# Patient Record
Sex: MALE | Race: WHITE
[De-identification: names, ages, dates, MRNs, and addresses within clinical notes are randomized per-mention and may not be internally consistent; named-entity substitution may affect disease eponyms.]

---

## 2019-10-21 ENCOUNTER — HOSPITAL ENCOUNTER (INPATIENT)
Dept: HOSPITAL 62 - ER | Age: 79
LOS: 5 days | Discharge: ANOTHER HOSPITAL | DRG: 378 | End: 2019-10-26
Attending: FAMILY MEDICINE | Admitting: FAMILY MEDICINE
Payer: MEDICARE

## 2019-10-21 DIAGNOSIS — K44.9: ICD-10-CM

## 2019-10-21 DIAGNOSIS — D68.0: ICD-10-CM

## 2019-10-21 DIAGNOSIS — N17.9: ICD-10-CM

## 2019-10-21 DIAGNOSIS — I95.1: ICD-10-CM

## 2019-10-21 DIAGNOSIS — N40.0: ICD-10-CM

## 2019-10-21 DIAGNOSIS — Z82.49: ICD-10-CM

## 2019-10-21 DIAGNOSIS — Z82.61: ICD-10-CM

## 2019-10-21 DIAGNOSIS — M06.9: ICD-10-CM

## 2019-10-21 DIAGNOSIS — K29.70: ICD-10-CM

## 2019-10-21 DIAGNOSIS — M19.90: ICD-10-CM

## 2019-10-21 DIAGNOSIS — Z83.3: ICD-10-CM

## 2019-10-21 DIAGNOSIS — D62: ICD-10-CM

## 2019-10-21 DIAGNOSIS — I35.0: ICD-10-CM

## 2019-10-21 DIAGNOSIS — K64.8: ICD-10-CM

## 2019-10-21 DIAGNOSIS — K92.1: Primary | ICD-10-CM

## 2019-10-21 DIAGNOSIS — E86.0: ICD-10-CM

## 2019-10-21 DIAGNOSIS — D69.59: ICD-10-CM

## 2019-10-21 DIAGNOSIS — M10.9: ICD-10-CM

## 2019-10-21 DIAGNOSIS — I10: ICD-10-CM

## 2019-10-21 LAB
ADD MANUAL DIFF: NO
ALBUMIN SERPL-MCNC: 3.8 G/DL (ref 3.5–5)
ALP SERPL-CCNC: 167 U/L (ref 38–126)
ANION GAP SERPL CALC-SCNC: 10 MMOL/L (ref 5–19)
APPEARANCE UR: (no result)
APTT PPP: (no result) S
AST SERPL-CCNC: 86 U/L (ref 17–59)
BASOPHILS # BLD AUTO: 0 10^3/UL (ref 0–0.2)
BASOPHILS NFR BLD AUTO: 0.5 % (ref 0–2)
BILIRUB DIRECT SERPL-MCNC: 0.2 MG/DL (ref 0–0.4)
BILIRUB SERPL-MCNC: 0.4 MG/DL (ref 0.2–1.3)
BILIRUB UR QL STRIP: NEGATIVE
BUN SERPL-MCNC: 30 MG/DL (ref 7–20)
CALCIUM: 9.4 MG/DL (ref 8.4–10.2)
CHLORIDE SERPL-SCNC: 108 MMOL/L (ref 98–107)
CK MB SERPL-MCNC: 1.1 NG/ML (ref ?–4.55)
CK SERPL-CCNC: 110 U/L (ref 55–170)
CO2 SERPL-SCNC: 22 MMOL/L (ref 22–30)
EOSINOPHIL # BLD AUTO: 0.6 10^3/UL (ref 0–0.6)
EOSINOPHIL NFR BLD AUTO: 12.4 % (ref 0–6)
ERYTHROCYTE [DISTWIDTH] IN BLOOD BY AUTOMATED COUNT: 15 % (ref 11.5–14)
FREE T4 (FREE THYROXINE): 0.85 NG/DL (ref 0.78–2.19)
GLUCOSE SERPL-MCNC: 118 MG/DL (ref 75–110)
GLUCOSE UR STRIP-MCNC: NEGATIVE MG/DL
HCT VFR BLD CALC: 28 % (ref 37.9–51)
HGB BLD-MCNC: 9.3 G/DL (ref 13.5–17)
INR PPP: 1.01
KETONES UR STRIP-MCNC: (no result) MG/DL
LYMPHOCYTES # BLD AUTO: 0.5 10^3/UL (ref 0.5–4.7)
LYMPHOCYTES NFR BLD AUTO: 11.5 % (ref 13–45)
MCH RBC QN AUTO: 35.1 PG (ref 27–33.4)
MCHC RBC AUTO-ENTMCNC: 33.4 G/DL (ref 32–36)
MCV RBC AUTO: 105 FL (ref 80–97)
MONOCYTES # BLD AUTO: 0.4 10^3/UL (ref 0.1–1.4)
MONOCYTES NFR BLD AUTO: 8.5 % (ref 3–13)
NEUTROPHILS # BLD AUTO: 3.2 10^3/UL (ref 1.7–8.2)
NEUTS SEG NFR BLD AUTO: 67.1 % (ref 42–78)
NITRITE UR QL STRIP: NEGATIVE
PH UR STRIP: 5 [PH] (ref 5–9)
PLATELET # BLD: 109 10^3/UL (ref 150–450)
POTASSIUM SERPL-SCNC: 4.4 MMOL/L (ref 3.6–5)
PROT SERPL-MCNC: 8.4 G/DL (ref 6.3–8.2)
PROT UR STRIP-MCNC: NEGATIVE MG/DL
PROTHROMBIN TIME: 13.3 SEC (ref 11.4–15.4)
RBC # BLD AUTO: 2.66 10^6/UL (ref 4.35–5.55)
SP GR UR STRIP: 1.02
T3FREE SERPL-MCNC: 2.82 PG/ML (ref 2.77–5.27)
TOTAL CELLS COUNTED % (AUTO): 100 %
TROPONIN I SERPL-MCNC: 0.04 NG/ML
TSH SERPL-ACNC: 6.03 UIU/ML (ref 0.47–4.68)
UROBILINOGEN UR-MCNC: NEGATIVE MG/DL (ref ?–2)
WBC # BLD AUTO: 4.8 10^3/UL (ref 4–10.5)

## 2019-10-21 PROCEDURE — 88342 IMHCHEM/IMCYTCHM 1ST ANTB: CPT

## 2019-10-21 PROCEDURE — 86920 COMPATIBILITY TEST SPIN: CPT

## 2019-10-21 PROCEDURE — 85027 COMPLETE CBC AUTOMATED: CPT

## 2019-10-21 PROCEDURE — 82553 CREATINE MB FRACTION: CPT

## 2019-10-21 PROCEDURE — 83735 ASSAY OF MAGNESIUM: CPT

## 2019-10-21 PROCEDURE — 43239 EGD BIOPSY SINGLE/MULTIPLE: CPT

## 2019-10-21 PROCEDURE — 82550 ASSAY OF CK (CPK): CPT

## 2019-10-21 PROCEDURE — 86901 BLOOD TYPING SEROLOGIC RH(D): CPT

## 2019-10-21 PROCEDURE — 93010 ELECTROCARDIOGRAM REPORT: CPT

## 2019-10-21 PROCEDURE — 81001 URINALYSIS AUTO W/SCOPE: CPT

## 2019-10-21 PROCEDURE — 84484 ASSAY OF TROPONIN QUANT: CPT

## 2019-10-21 PROCEDURE — 93306 TTE W/DOPPLER COMPLETE: CPT

## 2019-10-21 PROCEDURE — 84443 ASSAY THYROID STIM HORMONE: CPT

## 2019-10-21 PROCEDURE — 71046 X-RAY EXAM CHEST 2 VIEWS: CPT

## 2019-10-21 PROCEDURE — 36415 COLL VENOUS BLD VENIPUNCTURE: CPT

## 2019-10-21 PROCEDURE — 96375 TX/PRO/DX INJ NEW DRUG ADDON: CPT

## 2019-10-21 PROCEDURE — 85730 THROMBOPLASTIN TIME PARTIAL: CPT

## 2019-10-21 PROCEDURE — 84481 FREE ASSAY (FT-3): CPT

## 2019-10-21 PROCEDURE — C9113 INJ PANTOPRAZOLE SODIUM, VIA: HCPCS

## 2019-10-21 PROCEDURE — 36430 TRANSFUSION BLD/BLD COMPNT: CPT

## 2019-10-21 PROCEDURE — 86900 BLOOD TYPING SEROLOGIC ABO: CPT

## 2019-10-21 PROCEDURE — 80048 BASIC METABOLIC PNL TOTAL CA: CPT

## 2019-10-21 PROCEDURE — 85610 PROTHROMBIN TIME: CPT

## 2019-10-21 PROCEDURE — P9016 RBC LEUKOCYTES REDUCED: HCPCS

## 2019-10-21 PROCEDURE — S0028 INJECTION, FAMOTIDINE, 20 MG: HCPCS

## 2019-10-21 PROCEDURE — P9035 PLATELET PHERES LEUKOREDUCED: HCPCS

## 2019-10-21 PROCEDURE — 88305 TISSUE EXAM BY PATHOLOGIST: CPT

## 2019-10-21 PROCEDURE — 96366 THER/PROPH/DIAG IV INF ADDON: CPT

## 2019-10-21 PROCEDURE — 93005 ELECTROCARDIOGRAM TRACING: CPT

## 2019-10-21 PROCEDURE — 86850 RBC ANTIBODY SCREEN: CPT

## 2019-10-21 PROCEDURE — 85025 COMPLETE CBC W/AUTO DIFF WBC: CPT

## 2019-10-21 PROCEDURE — 99285 EMERGENCY DEPT VISIT HI MDM: CPT

## 2019-10-21 PROCEDURE — 84439 ASSAY OF FREE THYROXINE: CPT

## 2019-10-21 PROCEDURE — 80053 COMPREHEN METABOLIC PANEL: CPT

## 2019-10-21 PROCEDURE — 96365 THER/PROPH/DIAG IV INF INIT: CPT

## 2019-10-21 RX ADMIN — SODIUM CHLORIDE, SODIUM LACTATE, POTASSIUM CHLORIDE, AND CALCIUM CHLORIDE PRN MLS/HR: .6; .31; .03; .02 INJECTION, SOLUTION INTRAVENOUS at 23:59

## 2019-10-21 RX ADMIN — PANTOPRAZOLE SODIUM SCH: 40 INJECTION, POWDER, FOR SOLUTION INTRAVENOUS at 22:26

## 2019-10-21 RX ADMIN — SUCRALFATE SCH: 1 TABLET ORAL at 22:26

## 2019-10-21 RX ADMIN — Medication SCH: at 22:11

## 2019-10-21 RX ADMIN — ACETAMINOPHEN PRN MG: 325 TABLET ORAL at 23:55

## 2019-10-21 NOTE — ER DOCUMENT REPORT
ED Medical Screen (RME)





- General


Chief Complaint: Dizziness


Stated Complaint: DIZZINESS,VOMITING


Time Seen by Provider: 10/21/19 13:47


Primary Care Provider: 


MEGAN TRENT PA [Primary Care Provider] - Follow up as needed


Mode of Arrival: Wheelchair


Information source: Patient


Notes: 





78-year-old male presented to ED for difficulty breathing.  He states he went to

the doctor for this difficulty breathing and they did a EKG and when he was 

getting up to get ready to go home he got extremely dizzy almost fell if it 

would have if the person there had not caught him.  He states he vomited after 

that they checked his blood pressure and it was low.  His blood pressure is 

90/60 in the triage area.  Wife states he was disoriented after this episode.























I have greeted and performed a rapid initial assessment of this patient.  A 

comprehensive ED assessment and evaluation of the patient, analysis of test 

results and completion of medical decision making process will be conducted by 

an additional ED providers.


TRAVEL OUTSIDE OF THE [place] IN LAST 30 DAYS: No





- Related Data


Allergies/Adverse Reactions: 


                                        





No Known Allergies Allergy (Verified 05/22/15 10:54)


   











Past Medical History





- Past Medical History


Cardiac Medical History: Reports: Hx Hypertension


Musculoskeltal Medical History: Reports Hx Arthritis





- Immunizations


Hx Diphtheria, Pertussis, Tetanus Vaccination: Yes





Physical Exam





- Vital signs


Vitals: 





                                        











Temp Pulse Resp BP Pulse Ox


 


 98.2 F   72   16   90/60 L  98 


 


 10/21/19 13:33  10/21/19 13:33  10/21/19 13:33  10/21/19 13:33  10/21/19 13:33














Course





- Vital Signs


Vital signs: 





                                        











Temp Pulse Resp BP Pulse Ox


 


 98.2 F   72   16   90/60 L  98 


 


 10/21/19 13:33  10/21/19 13:33  10/21/19 13:33  10/21/19 13:33  10/21/19 13:33














Doctor's Discharge





- Discharge


Referrals: 


MEGAN TRENT PA [Primary Care Provider] - Follow up as needed

## 2019-10-21 NOTE — EKG REPORT
SEVERITY:- ABNORMAL ECG -

PACEMAKER SPIKES OR ARTIFACTS

SINUS RHYTHM

FIRST DEGREE AV BLOCK

:

Confirmed by: Shantelle Gleason MD 21-Oct-2019 16:54:12

## 2019-10-21 NOTE — ER DOCUMENT REPORT
ED General





- General


Chief Complaint: Dizziness


Stated Complaint: DIZZINESS,VOMITING


Time Seen by Provider: 10/21/19 13:47


Primary Care Provider: 


MEGAN TRENT PA [Primary Care Provider] - Follow up as needed


Mode of Arrival: Wheelchair


Information source: Patient, Relative


TRAVEL OUTSIDE OF THE [place] IN LAST 30 DAYS: No





- HPI


Notes: 





78-year-old male presented to ED for difficulty breathing.  He states he went to

the doctor for this difficulty breathing and they did a EKG and when he was 

getting up to get ready to go home he got extremely dizzy almost fell if it 

would have if the person there had not caught him.  He states he vomited after 

that they checked his blood pressure and it was low.  His blood pressure is 9

0/60 in the triage area.  Wife states he was disoriented after this episode 

briefly, but quickly was acting like his normal self thereafter.  There is no 

focal unilateral motor or sensory deficit.  The patient admits to feeling 

lightheaded over the course the past week.  He does note having somewhat dark-

colored bowel movements.





Patient denies any chest pain, abdominal pain, fever, constipation, significant 

diarrhea, bright red blood per rectum, fever, chills.





Patient does report urinary frequency with sensation of incomplete voiding.  

patient is on Flomax.





No prior history of GI bleeds.  Patient does have a history of gout, 

hypertension, rheumatoid arthritis and previous neck surgery.





Social history non-smoker, rare alcohol use, not to exceed 1 drink per day.





Regular practitioner Dr. Ledesma with [facility].





Medications include Lexapro, folic acid, Plavix, Flomax, methotrexate, losartan,

allopurinol, 2 Barry, Singulair, amlodipine, leflunomide








- Related Data


Allergies/Adverse Reactions: 


                                        





No Known Allergies Allergy (Verified 05/22/15 10:54)


   











Past Medical History





- General


Information source: Patient





- Social History


Smoking Status: Never Smoker


Frequency of alcohol use: None


Drug Abuse: None


Lives with: Family


Family History: Reviewed & Not Pertinent


Patient has suicidal ideation: No


Patient has homicidal ideation: No





- Past Medical History


Cardiac Medical History: Reports: Hx Hypertension


Musculoskeletal Medical History: Reports Hx Arthritis





- Immunizations


Hx Diphtheria, Pertussis, Tetanus Vaccination: Yes





Review of Systems





- Review of Systems


-: Yes All other systems reviewed and negative





Physical Exam





- Vital signs


Vitals: 


                                        











Temp Pulse Resp BP Pulse Ox


 


 98.2 F   72   16   90/60 L  98 


 


 10/21/19 13:33  10/21/19 13:33  10/21/19 13:33  10/21/19 13:33  10/21/19 13:33














- Notes


Notes: 





PHYSICAL EXAMINATION:





GENERAL: Well-appearing, well-nourished and in no acute distress.





HEAD: Atraumatic, normocephalic.





EYES: Pupils equal round and reactive to light, extraocular movements intact, 

sclera anicteric, conjunctiva are normal.





ENT: Nares patent, oropharynx clear without exudates.  Slightly dry mucous 

membranes.





NECK: Normal range of motion, supple without lymphadenopathy





LUNGS: Breath sounds clear to auscultation bilaterally and equal.  No wheezes 

rales or rhonchi.





HEART: Regular rate and rhythm with 4/6 holosystolic ejection murmur best 

auscultated over the apex.





ABDOMEN: Soft, nontender, nondistended abdomen.  No guarding, no rebound.  No 

masses appreciated.





Rectal exam shows flash heme positive with a slightly dark bowel movement 

specimen.  Prostate firm but nontender and without nodularity.





Musculoskeletal: Normal range of motion, no pitting or edema.  No cyanosis.





NEUROLOGICAL: Cranial nerves grossly intact.  Normal speech, normal gait.  

Normal sensory, motor exams.  No focal deficits.





PSYCH: Normal mood, normal affect.





SKIN: Warm, Dry, normal turgor, no rashes or lesions noted.





Course





- Re-evaluation


Re-evalutation: 





10/21/19 18:42


Discussion was undertaken with the patient regarding possible transfusion, and 

the patient was in agreement if he would need one.  Initial hemoglobin was 9.3, 

where is the patient's usual hemoglobin ranges from 11.7-15.3.  White blood cell

count somewhat low at 4.8 and platelet count was 109.  Patient had a BUN of 30 

and a creatinine of 1.6, which patient had a prior creatinine of 1.49 in the 

past but otherwise has more normal creatinine levels.





Patient was given IV Pepcid and p.o. Carafate and Protonix bolus with a Protonix

drip.  Patient was given normal saline 500 cc.  Blood pressure initially was 

90/60 and a repeat blood pressure was 92/50.  After IV fluids, the patient had 

no further hypotension noted.  O2 sat remained stable and chest x-ray was clear.

 Patient's dyspnea appears secondary to his hypotension from his GI blood loss.





Troponin mildly elevated at 0.045, but patient denied ever having any chest 

pain.  Repeat troponin improved at 0.035.


10/21/19 18:52





Discussion was undertaken with the patient and family and they were in agreement

with the patient being admitted for further evaluation and care.  Discussion was

undertaken with Dr. Weiland who agreed to admit the patient for further care and

management.  No evidence for urinary retention as a post void residual was only 

81.  The patient did have mild ketosis with Hyaline casts in his urine 

consistent with dehydration.  The patient states he has been drinking less 

fluids due to his urinary frequency recently.


10/21/19 19:34








- Vital Signs


Vital signs: 


                                        











Temp Pulse Resp BP Pulse Ox


 


 97.8 F   71   18   146/65 H  100 


 


 10/21/19 19:05  10/21/19 14:10  10/21/19 19:05  10/21/19 19:05  10/21/19 19:05














- Laboratory


Result Diagrams: 


                                 10/21/19 14:20





                                 10/21/19 14:20


Laboratory results interpreted by me: 


                                        











  10/21/19 10/21/19 10/21/19





  14:20 14:20 14:20


 


RBC  2.66 L  


 


Hgb  9.3 L  


 


Hct  28.0 L  


 


MCV  105 H  


 


MCH  35.1 H  


 


RDW  15.0 H  


 


Plt Count  109 L  


 


Lymph % (Auto)  11.5 L  


 


Eos % (Auto)  12.4 H  


 


Chloride   108 H 


 


BUN   30 H 


 


Creatinine   1.60 H 


 


Est GFR ( Amer)   51 L 


 


Est GFR (MDRD) Non-Af   42 L 


 


Glucose   118 H 


 


AST   86 H 


 


Alkaline Phosphatase   167 H 


 


Total Protein   8.4 H 


 


Urine Ketones    TRACE H


 


Urine Ascorbic Acid    40 H














- EKG Interpretation by Me


EKG shows normal: Sinus rhythm


Additional EKG results interpreted by me: 





10/21/19 19:36


EKG is interpreted by me showed sinus rhythm heart rate of 67 with borderline 

first-degree AV block noted.  There is no gross evidence for acute MI or 

ischemia noted.  No old EKG available for comparison.





Critical Care Note





- Critical Care Note


Total time excluding time spent on procedures (mins): 43





Discharge





- Discharge


Clinical Impression: 


 Thrombocytopenia, Acute anemia, Renal insufficiency, Near syncope, Dehydration





GI bleed


Qualifiers:


 GI bleed type/associated pathology: unspecified gastrointestinal hemorrhage 

type Qualified Code(s): K92.2 - Gastrointestinal hemorrhage, unspecified





Hypotension


Qualifiers:


 Hypotension type: hypotension due to hypovolemia Qualified Code(s): I95.89 - 

Other hypotension





Disposition: ADMITTED AS INPATIENT


Admitting Provider: Weiland (Hospitalist)


Referrals: 


MEGAN TRENT PA [Primary Care Provider] - Follow up as needed

## 2019-10-21 NOTE — RADIOLOGY REPORT (SQ)
EXAM DESCRIPTION:  CHEST 2 VIEWS



COMPLETED DATE/TIME:  10/21/2019 2:35 pm



REASON FOR STUDY:  syncope



COMPARISON:  5/22/2015.



EXAM PARAMETERS:  NUMBER OF VIEWS: two views

TECHNIQUE: Digital Frontal and Lateral radiographic views of the chest acquired.

RADIATION DOSE: NA

LIMITATIONS: none



FINDINGS:  LUNGS AND PLEURA: No opacities, masses or pneumothorax. No pleural effusion.

MEDIASTINUM AND HILAR STRUCTURES: No masses or contour abnormalities.

HEART AND VASCULAR STRUCTURES: Heart normal size.  No evidence for failure.

BONES: No acute findings.

HARDWARE: Hardware in the cervical spine.

OTHER: No other significant finding.



IMPRESSION:  NO ACUTE RADIOGRAPHIC FINDING IN THE CHEST.



TECHNICAL DOCUMENTATION:  JOB ID:  [id number]

 2011 [employer]- All Rights Reserved



Reading location - IP/workstation name: [email or web address]

## 2019-10-22 LAB
ADD MANUAL DIFF: NO
ANION GAP SERPL CALC-SCNC: 6 MMOL/L (ref 5–19)
APTT BLD: 30.7 SEC (ref 23.5–35.8)
BASOPHILS # BLD AUTO: 0 10^3/UL (ref 0–0.2)
BASOPHILS NFR BLD AUTO: 0.5 % (ref 0–2)
BUN SERPL-MCNC: 23 MG/DL (ref 7–20)
CALCIUM: 8.3 MG/DL (ref 8.4–10.2)
CHLORIDE SERPL-SCNC: 109 MMOL/L (ref 98–107)
CK MB SERPL-MCNC: 1.04 NG/ML (ref ?–4.55)
CK MB SERPL-MCNC: 1.05 NG/ML (ref ?–4.55)
CK MB SERPL-MCNC: 1.43 NG/ML (ref ?–4.55)
CO2 SERPL-SCNC: 23 MMOL/L (ref 22–30)
EOSINOPHIL # BLD AUTO: 0.6 10^3/UL (ref 0–0.6)
EOSINOPHIL NFR BLD AUTO: 19.6 % (ref 0–6)
ERYTHROCYTE [DISTWIDTH] IN BLOOD BY AUTOMATED COUNT: 15 % (ref 11.5–14)
ERYTHROCYTE [DISTWIDTH] IN BLOOD BY AUTOMATED COUNT: 18.7 % (ref 11.5–14)
GLUCOSE SERPL-MCNC: 94 MG/DL (ref 75–110)
HCT VFR BLD CALC: 20.9 % (ref 37.9–51)
HCT VFR BLD CALC: 27.8 % (ref 37.9–51)
HGB BLD-MCNC: 7.1 G/DL (ref 13.5–17)
HGB BLD-MCNC: 9.5 G/DL (ref 13.5–17)
INR PPP: 1.08
LYMPHOCYTES # BLD AUTO: 0.6 10^3/UL (ref 0.5–4.7)
LYMPHOCYTES NFR BLD AUTO: 20.1 % (ref 13–45)
MCH RBC QN AUTO: 33.2 PG (ref 27–33.4)
MCH RBC QN AUTO: 35.4 PG (ref 27–33.4)
MCHC RBC AUTO-ENTMCNC: 34 G/DL (ref 32–36)
MCHC RBC AUTO-ENTMCNC: 34.3 G/DL (ref 32–36)
MCV RBC AUTO: 104 FL (ref 80–97)
MCV RBC AUTO: 97 FL (ref 80–97)
MONOCYTES # BLD AUTO: 0.3 10^3/UL (ref 0.1–1.4)
MONOCYTES NFR BLD AUTO: 10.8 % (ref 3–13)
NEUTROPHILS # BLD AUTO: 1.5 10^3/UL (ref 1.7–8.2)
NEUTS SEG NFR BLD AUTO: 49 % (ref 42–78)
PLATELET # BLD: 74 10^3/UL (ref 150–450)
PLATELET # BLD: 82 10^3/UL (ref 150–450)
POTASSIUM SERPL-SCNC: 4.1 MMOL/L (ref 3.6–5)
PROTHROMBIN TIME: 14 SEC (ref 11.4–15.4)
RBC # BLD AUTO: 2.01 10^6/UL (ref 4.35–5.55)
RBC # BLD AUTO: 2.88 10^6/UL (ref 4.35–5.55)
TOTAL CELLS COUNTED % (AUTO): 100 %
TROPONIN I SERPL-MCNC: 0.04 NG/ML
TROPONIN I SERPL-MCNC: 0.05 NG/ML
TROPONIN I SERPL-MCNC: 0.05 NG/ML
WBC # BLD AUTO: 2.7 10^3/UL (ref 4–10.5)
WBC # BLD AUTO: 3.1 10^3/UL (ref 4–10.5)

## 2019-10-22 PROCEDURE — 30233N1 TRANSFUSION OF NONAUTOLOGOUS RED BLOOD CELLS INTO PERIPHERAL VEIN, PERCUTANEOUS APPROACH: ICD-10-PCS | Performed by: STUDENT IN AN ORGANIZED HEALTH CARE EDUCATION/TRAINING PROGRAM

## 2019-10-22 RX ADMIN — TAMSULOSIN HYDROCHLORIDE SCH MG: 0.4 CAPSULE ORAL at 17:45

## 2019-10-22 RX ADMIN — SODIUM CHLORIDE, SODIUM LACTATE, POTASSIUM CHLORIDE, AND CALCIUM CHLORIDE PRN MLS/HR: .6; .31; .03; .02 INJECTION, SOLUTION INTRAVENOUS at 11:42

## 2019-10-22 RX ADMIN — Medication SCH: at 13:33

## 2019-10-22 RX ADMIN — PANTOPRAZOLE SODIUM SCH MG: 40 INJECTION, POWDER, FOR SOLUTION INTRAVENOUS at 09:27

## 2019-10-22 RX ADMIN — MULTIVITAMIN TABLET SCH TAB: TABLET at 09:27

## 2019-10-22 RX ADMIN — SUCRALFATE SCH GM: 1 TABLET ORAL at 08:44

## 2019-10-22 RX ADMIN — ESCITALOPRAM OXALATE SCH MG: 10 TABLET, FILM COATED ORAL at 09:27

## 2019-10-22 RX ADMIN — SUCRALFATE SCH GM: 1 TABLET ORAL at 12:03

## 2019-10-22 RX ADMIN — SODIUM CHLORIDE, SODIUM LACTATE, POTASSIUM CHLORIDE, AND CALCIUM CHLORIDE PRN MLS/HR: .6; .31; .03; .02 INJECTION, SOLUTION INTRAVENOUS at 05:43

## 2019-10-22 RX ADMIN — SODIUM CHLORIDE, SODIUM LACTATE, POTASSIUM CHLORIDE, AND CALCIUM CHLORIDE PRN MLS/HR: .6; .31; .03; .02 INJECTION, SOLUTION INTRAVENOUS at 18:13

## 2019-10-22 RX ADMIN — ALLOPURINOL SCH MG: 300 TABLET ORAL at 09:27

## 2019-10-22 RX ADMIN — DOCUSATE SODIUM SCH MG: 50 LIQUID ORAL at 09:26

## 2019-10-22 RX ADMIN — DOCUSATE SODIUM SCH: 50 LIQUID ORAL at 17:45

## 2019-10-22 RX ADMIN — SUCRALFATE SCH GM: 1 TABLET ORAL at 21:06

## 2019-10-22 RX ADMIN — MONTELUKAST SODIUM SCH MG: 10 TABLET, FILM COATED ORAL at 17:45

## 2019-10-22 RX ADMIN — FOLIC ACID SCH MG: 1 TABLET ORAL at 09:27

## 2019-10-22 RX ADMIN — SUCRALFATE SCH GM: 1 TABLET ORAL at 16:25

## 2019-10-22 RX ADMIN — TAMSULOSIN HYDROCHLORIDE SCH MG: 0.4 CAPSULE ORAL at 09:27

## 2019-10-22 RX ADMIN — Medication SCH: at 21:06

## 2019-10-22 RX ADMIN — PANTOPRAZOLE SODIUM SCH MG: 40 INJECTION, POWDER, FOR SOLUTION INTRAVENOUS at 21:06

## 2019-10-22 RX ADMIN — Medication SCH: at 05:40

## 2019-10-22 NOTE — PDOC H&P
History of Present Illness


Admission Date/PCP: 


10/21/2019   19:37  


MYRTLE MUSA


Patient complains of: Dyspnea


History of Present Illness: 


LYNETTE BROWN is a 78 year old male who presented to the emergency room from

his primary care provider's office where he had been noted to be hypotensive on 

an evaluation for a one-week history of dyspnea.  Patient admits gradually 

worsening dyspnea over the last week becoming moderate to severe resulting in 

his visit to his doctor today.  He admits that his dyspnea worsens with any 

exertion and has been associated with lightheadedness with standing or walking. 

He also acknowledges the accompanying symptoms of dark-colored bowel movements 

and decreased oral intake.  He denies other associated or accompanying signs and

symptoms.  He denies prior similar episodes and has not identified any 

additional aggravating or ameliorating factors for his dyspnea.  In the 

emergency room he was found to have postural hypotension and was noted to have a

hemoglobin of 9.3 with a Hemoccult positive stool.  His BUN was 30 with a 

creatinine of 1.6.  He was subsequently admitted to the hospital for further 

evaluation treatment.





Past Medical History


Cardiac Medical History: Reports: Hypertension


   Denies: Coronary Artery Disease, Hyperlipidema


Pulmonary Medical History: 


   Denies: Asthma, Chronic Obstructive Pulmonary Disease (COPD)


EENT Medical History: Reports: Nose - Allergic rhinitis


   Denies: Cataracts, Ears - Hearing aids


Neurological Medical History: 


   Denies: Hemorrhagic CVA, Ischemic CVA, Seizures


Endocrine Medical History: 


   Denies: Diabetes Mellitus Type 1, Diabetes Mellitus Type 2, Hyperthyroidism, 

Hypothyroidism, Obesity


Renal/ Medical History: Reports: Other - Benign prostatic hyperplasia


   Denies: Chronic Kidney Disease, Nephrolithiasis


Malignancy Medical History: Reports: None


GI Medical History: 


   Denies: Cirrhosis, Crohn's Disease, Gastroesophageal Reflux Disease, 

Hepatitis, Peptic Ulcer Disease, Ulcerative Colitis


Musculoskeltal Medical History: Reports: Arthritis - Rheumatoid arthritis, Gout


Skin Medical History: 


   Denies: Eczema, Psoriasis


Psychiatric Medical History: Reports: Depression


   Denies: Alcohol Dependency, Substance Abuse, Tobacco Dependency


Traumatic Medical History: Reports: None


Hematology: Reports: Anemia


   Denies: Bleeding Tendencies


Infectious Medical History: Reports: None





Past Surgical History


Past Surgical History: Reports: Orthopedic Surgery - Neck surgery





Social History


Information Source: Patient


Lives with: Spouse/Significant other


Smoking Status: Never Smoker


Electronic Cigarette use?: No


Frequency of Alcohol Use: Rare


Hx Recreational Drug Use: No


Drugs: None


Hx Prescription Drug Abuse: No





- Advance Directive


Resuscitation Status: Full Code


Surrogate healthcare decision maker:: 


Spenser Brown





Family History


Family History: Arthritis, CAD, DM, Hypertension, Malignancy, Other - Peripheral

vascular disease


Parental Family History Reviewed: Yes


Children Family History Reviewed: No


Sibling(s) Family History Reviewed.: Yes





Medication/Allergy


Home Medications: 








Allopurinol [Zyloprim 300 mg Tablet] 300 mg PO DAILY 10/21/19 


Amlodipine Besylate [Norvasc 5 mg Tablet] 5 mg PO DAILY 10/21/19 


Azelastine HCl 1 spray NS BID 10/21/19 


Clopidogrel Bisulfate [Plavix 75 mg Tablet] 75 mg PO DAILY 10/21/19 


Escitalopram Oxalate [Lexapro 10 mg Tablet] 10 mg PO DAILY 10/21/19 


Folic Acid [Folvite 1 mg Tablet] 1 mg PO DAILY 10/21/19 


Leflunomide [Arava 20 mg Tablet] 20 mg PO DAILY 10/21/19 


Losartan Potassium [Cozaar 50 mg Tablet] 50 mg PO DAILY 10/21/19 


Methotrexate Sodium [Rheumatrex 2.5 mg Tablet] 5 mg PO FR@1000 10/21/19 


Montelukast Sodium [Singulair 10 mg Tablet] 10 mg PO QPM 10/21/19 


Multivitamin [One-A-Day Essential] 1 each PO DAILY 10/21/19 


Tamsulosin HCl [Flomax] 0.4 mg PO BID 10/21/19 


Turmeric Root Extract [Turmeric Curcumin] 500 mg PO DAILY 10/21/19 








Allergies/Adverse Reactions: 


                                        





No Known Allergies Allergy (Verified 05/22/15 10:54)


   











Review of Systems


Constitutional: PRESENT: as per HPI, anorexia, other - Lightheadedness with 

standing.  ABSENT: chills, fever(s)


Eyes: ABSENT: visual disturbances, other - Eye pain


Ears: ABSENT: hearing changes, other - Ear pain


Nose, Mouth, and Throat: ABSENT: mouth pain, sore throat


Cardiovascular: PRESENT: dyspnea on exertion.  ABSENT: chest pain, palpitations


Respiratory: PRESENT: dyspnea.  ABSENT: cough


Gastrointestinal: PRESENT: as per HPI, melena.  ABSENT: abdominal pain, 

constipation, diarrhea, hematochezia, nausea, vomiting


Genitourinary: ABSENT: dysuria, hematuria


Musculoskeletal: ABSENT: joint swelling, muscle weakness


Integumentary: ABSENT: pruritus, rash


Neurological: PRESENT: as per HPI, other - Lightheadedness with standing or 

activity.  ABSENT: confusion, convulsions, focal weakness, memory loss, syncope


Psychiatric: ABSENT: anxiety, depression


Endocrine: ABSENT: cold intolerance, heat intolerance


Hematologic/Lymphatic: ABSENT: easy bleeding, easy bruising


Allergic/Immunologic: PRESENT: seasonal rhinorrhea





Physical Exam


Vital Signs: 


                                        











Temp Pulse Resp BP Pulse Ox


 


 97.8 F   71   18   146/65 H  100 


 


 10/21/19 19:05  10/21/19 14:10  10/21/19 19:05  10/21/19 19:05  10/21/19 19:05








                                 Intake & Output











 10/19/19 10/20/19 10/21/19





 23:59 23:59 23:59


 


Intake Total   500


 


Balance   500


 


Weight   75.3 kg











General appearance: PRESENT: no acute distress, cooperative


Head exam: PRESENT: atraumatic, normocephalic


Eye exam: PRESENT: conjunctiva pink.  ABSENT: conjunctival injection, scleral 

icterus


Ear exam: PRESENT: normal external ear exam.  ABSENT: bleeding, drainage


Mouth exam: PRESENT: dry mucosa, neck supple


Neck exam: ABSENT: thyromegaly, tracheal deviation


Respiratory exam: PRESENT: clear to auscultation luz, symmetrical, unlabored


Cardiovascular exam: PRESENT: RRR.  ABSENT: clicks, gallop, rubs


Pulses: PRESENT: normal radial pulses, normal dorsalis pedis pul


Vascular exam: PRESENT: normal capillary refill.  ABSENT: pallor


GI/Abdominal exam: PRESENT: normal bowel sounds, soft.  ABSENT: tenderness


Rectal exam: PRESENT: deferred


Extremities exam: ABSENT: joint swelling, pedal edema


Musculoskeletal exam: ABSENT: deformity, dislocation


Neurological exam: PRESENT: alert, oriented to person, oriented to place, 

oriented to time, oriented to situation, CN II-XII grossly intact.  ABSENT: 

motor sensory deficit


Psychiatric exam: PRESENT: appropriate affect, normal mood


Skin exam: PRESENT: dry, intact, warm.  ABSENT: jaundice, rash, urticaria





Results


Laboratory Results: 


                                        





                                 10/21/19 14:20 





                                 10/21/19 14:20 





                                        











  10/21/19 10/21/19 10/21/19





  14:20 14:20 14:20


 


WBC  4.8  


 


RBC  2.66 L  


 


Hgb  9.3 L  


 


Hct  28.0 L  


 


MCV  105 H  


 


MCH  35.1 H  


 


MCHC  33.4  


 


RDW  15.0 H  


 


Plt Count  109 L  


 


Seg Neutrophils %  67.1  


 


Sodium   140.3 


 


Potassium   4.4 


 


Chloride   108 H 


 


Carbon Dioxide   22 


 


Anion Gap   10 


 


BUN   30 H 


 


Creatinine   1.60 H 


 


Est GFR ( Amer)   51 L 


 


Glucose   118 H 


 


Calcium   9.4 


 


Total Bilirubin   0.4 


 


AST   86 H 


 


Alkaline Phosphatase   167 H 


 


Total Protein   8.4 H 


 


Albumin   3.8 


 


Urine Color    MARLY


 


Urine Appearance    SLIGHTLY-CLOUDY


 


Urine pH    5.0


 


Ur Specific Gravity    1.020


 


Urine Protein    NEGATIVE


 


Urine Glucose (UA)    NEGATIVE


 


Urine Ketones    TRACE H


 


Urine Blood    NEGATIVE


 


Urine Nitrite    NEGATIVE


 


Ur Leukocyte Esterase    NEGATIVE


 


Urine WBC (Auto)    3


 


Urine RBC (Auto)    1


 


Blood Type   


 


Antibody Screen   














  10/21/19





  18:20


 


WBC 


 


RBC 


 


Hgb 


 


Hct 


 


MCV 


 


MCH 


 


MCHC 


 


RDW 


 


Plt Count 


 


Seg Neutrophils % 


 


Sodium 


 


Potassium 


 


Chloride 


 


Carbon Dioxide 


 


Anion Gap 


 


BUN 


 


Creatinine 


 


Est GFR (African Amer) 


 


Glucose 


 


Calcium 


 


Total Bilirubin 


 


AST 


 


Alkaline Phosphatase 


 


Total Protein 


 


Albumin 


 


Urine Color 


 


Urine Appearance 


 


Urine pH 


 


Ur Specific Gravity 


 


Urine Protein 


 


Urine Glucose (UA) 


 


Urine Ketones 


 


Urine Blood 


 


Urine Nitrite 


 


Ur Leukocyte Esterase 


 


Urine WBC (Auto) 


 


Urine RBC (Auto) 


 


Blood Type  Cancelled


 


Antibody Screen  Cancelled








                                        











  10/21/19 10/21/19 10/21/19





  14:20 14:20 18:20


 


Creatine Kinase  110  


 


CK-MB (CK-2)   1.10 


 


Troponin I   0.045  0.035











Impressions: 


                                        





Chest X-Ray  10/21/19 13:51


IMPRESSION:  NO ACUTE RADIOGRAPHIC FINDING IN THE CHEST.


 














Assessment and Plan





- Diagnosis


(1) Upper gastrointestinal bleed


Is this a current diagnosis for this admission?: Yes   


Plan: 


Patient will be admitted to [facility] and serial CBCs will be performed to evaluate 

his bleeding.  A surgical consultation will be obtained for evaluation of his 

gastrointestinal hemorrhage.  Transfusion will be provided if appropriate.  

Patient will be maintained on IV fluids and will receive Protonix 40 mg IV every

12 hours.  He will be on a clear liquid diet and his vital signs including 

orthostatic vital signs will be monitored closely.








(2) Postural hypotension


Is this a current diagnosis for this admission?: Yes   


Plan: 


Patient will be treated with IV fluid resuscitation and careful and frequent 

monitoring of his vital signs.  Falls precautions will be in place.








(3) Acute kidney injury (nontraumatic)


Is this a current diagnosis for this admission?: Yes   


Plan: 


Patient will be treated with IV fluids and his renal functions be monitored on a

regular basis with metabolic profiles.








(4) Anemia due to acute blood loss


Is this a current diagnosis for this admission?: Yes   


Plan: 


Patient's hemoglobin will be monitored with serial CBCs and transfusion will be 

provided if required.








(5) Thrombocytopenia


Is this a current diagnosis for this admission?: Yes   


Plan: 


Patient's platelet count will be monitored with serial CBCs with intervention 

provided as required.








(6) Rheumatoid arthritis


Qualifiers: 


   Rheumatoid arthritis location: unspecified site   Rheumatoid factor presence:

unspecified presence   Qualified Code(s): M06.9 - Rheumatoid arthritis, 

unspecified   


Is this a current diagnosis for this admission?: Yes   


Plan: 


Patient's current therapeutic agents will be held until after resolution of his 

gastrointestinal hemorrhage.  He will have available Nubain 5 to 10 mg IV every 

3 hours on a as needed basis for pain control due to his rheumatoid arthritis.








(7) Benign prostatic hyperplasia


Qualifiers: 


   Lower urinary tract symptom presence: unspecified whether lower urinary tract

symptoms present   Qualified Code(s): N40.0 - Benign prostatic hyperplasia 

without lower urinary tract symptoms   


Is this a current diagnosis for this admission?: Yes   


Plan: 


Patient will be continued on his usual therapeutic regiment using Flomax.  His 

urine output will be monitored closely and intervention will be provided if 

needed.








- Time


Time Spent with patient: 25-34 minutes


Medications reviewed and adjusted accordingly: Yes


Anticipated discharge: Home





- Inpatient Certification


Based on my medical assessment, after consideration of the patient's kenya

rbidities, presenting symptoms, or acuity I expect that the services needed 

warrant INPATIENT care.: Yes


I certify that my determination is in accordance with my understanding of 

Medicare's requirements for reasonable and necessary INPATIENT services [42 CFR 

412.3e].: Yes


Medical Necessity: Significant Comorbidiites Make Outpatient Treatment Too 

Risky, Need Close Monitoring Due to Risk of Patient Decompensation, Need For IV 

Fluids, Need For Continuous Telemetry Monitoring, Need for Surgery, Risk of 

Complication if Not Cared For in Hospital

## 2019-10-22 NOTE — PDOC PROGRESS REPORT
Subjective


Progress Note for:: 10/22/19


Subjective:: 


This is a 78-year-old male who presented with a few week history of melanotic 

stools, lightheadedness and shortness of breath.  He was found to be anemic and 

was found to have a positive FOBT.  He was admitted for likely upper GI bleed.





This morning, patient appears comfortable.  He denies abdominal pain.  He does 

report that he takes a lot of Aleve at home for his joint pains.  Hemoglobin 

dropped down to 7.1.  Will order 2 units of packed RBC.  Surgical has been 

consulted for EGD.





Reason For Visit: 


ACUTE UPPER GI BLEEDING,POSTURAL HYPOTENSION,








Physical Exam


Vital Signs: 


                                        











Temp Pulse Resp BP Pulse Ox


 


 97.3 F   63   18   154/62 H  98 


 


 10/22/19 15:48  10/22/19 15:48  10/22/19 15:48  10/22/19 15:48  10/22/19 15:48








                                 Intake & Output











 10/21/19 10/22/19 10/23/19





 06:59 06:59 06:59


 


Intake Total  1457 1449


 


Output Total  375 


 


Balance  1082 1449


 


Weight  168 lb 3.403 oz 168 lb 3.403 oz











General appearance: PRESENT: no acute distress, well-developed, well-nourished


Head exam: PRESENT: atraumatic, normocephalic


Eye exam: PRESENT: conjunctiva pale, EOMI, PERRLA.  ABSENT: scleral icterus


Ear exam: PRESENT: normal external ear exam


Mouth exam: PRESENT: moist, tongue midline


Neck exam: ABSENT: carotid bruit, JVD, lymphadenopathy, thyromegaly


Respiratory exam: PRESENT: clear to auscultation luz.  ABSENT: rales, rhonchi, 

wheezes


Cardiovascular exam: PRESENT: RRR.  ABSENT: diastolic murmur, rubs, systolic 

murmur


Pulses: PRESENT: normal dorsalis pedis pul


GI/Abdominal exam: PRESENT: normal bowel sounds, soft.  ABSENT: distended, 

guarding, mass, organolmegaly, rebound, tenderness


Rectal exam: PRESENT: deferred


Extremities exam: PRESENT: full ROM.  ABSENT: calf tenderness, clubbing, pedal 

edema


Neurological exam: PRESENT: alert, awake, oriented to person, oriented to place,

oriented to time, oriented to situation, CN II-XII grossly intact.  ABSENT: 

motor sensory deficit





Results


Laboratory Results: 


                                        





                                 10/22/19 06:15 





                                 10/22/19 06:15 





                                        











  10/21/19 10/21/19 10/21/19





  14:20 14:20 18:20


 


WBC   


 


RBC   


 


Hgb   


 


Hct   


 


MCV   


 


MCH   


 


MCHC   


 


RDW   


 


Plt Count   


 


Sodium   


 


Potassium   


 


Chloride   


 


Carbon Dioxide   


 


Anion Gap   


 


BUN   


 


Creatinine   


 


Est GFR (African Amer)   


 


Glucose   


 


Calcium   


 


Magnesium   


 


TSH   6.03 H 


 


Free T4   0.85 


 


Free T3 pg/mL   2.82 


 


Urine Color  MARLY  


 


Urine Appearance  SLIGHTLY-CLOUDY  


 


Urine pH  5.0  


 


Ur Specific Gravity  1.020  


 


Urine Protein  NEGATIVE  


 


Urine Glucose (UA)  NEGATIVE  


 


Urine Ketones  TRACE H  


 


Urine Blood  NEGATIVE  


 


Urine Nitrite  NEGATIVE  


 


Ur Leukocyte Esterase  NEGATIVE  


 


Urine WBC (Auto)  3  


 


Urine RBC (Auto)  1  


 


Blood Type    Cancelled


 


Antibody Screen    Cancelled














  10/21/19 10/22/19 10/22/19





  19:04 06:15 06:15


 


WBC   2.7 L D 


 


RBC   2.01 L 


 


Hgb   7.1 L D 


 


Hct   20.9 L 


 


MCV   104 H 


 


MCH   35.4 H 


 


MCHC   34.0 


 


RDW   15.0 H 


 


Plt Count   82 L 


 


Sodium    137.7


 


Potassium    4.1


 


Chloride    109 H


 


Carbon Dioxide    23


 


Anion Gap    6


 


BUN    23 H


 


Creatinine    1.31 H


 


Est GFR ( Amer)    > 60


 


Glucose    94


 


Calcium    8.3 L


 


Magnesium    1.9


 


TSH   


 


Free T4   


 


Free T3 pg/mL   


 


Urine Color   


 


Urine Appearance   


 


Urine pH   


 


Ur Specific Gravity   


 


Urine Protein   


 


Urine Glucose (UA)   


 


Urine Ketones   


 


Urine Blood   


 


Urine Nitrite   


 


Ur Leukocyte Esterase   


 


Urine WBC (Auto)   


 


Urine RBC (Auto)   


 


Blood Type  A POSITIVE  


 


Antibody Screen  NEGATIVE  








                                        











  10/21/19 10/21/19 10/21/19





  14:20 14:20 18:20


 


Creatine Kinase  110  


 


CK-MB (CK-2)   1.10 


 


Troponin I   0.045  0.035














  10/21/19 10/21/19 10/22/19





  18:20 18:20 00:39


 


Creatine Kinase  98  


 


CK-MB (CK-2)   0.88  1.04


 


Troponin I   Cancelled  0.050














  10/22/19 10/22/19 10/22/19





  00:39 06:15 06:15


 


Creatine Kinase  98  108 


 


CK-MB (CK-2)    1.05


 


Troponin I    0.047














  10/22/19 10/22/19





  12:46 12:46


 


Creatine Kinase  126 


 


CK-MB (CK-2)   1.43


 


Troponin I   0.038











Impressions: 


                                        





Chest X-Ray  10/21/19 13:51


IMPRESSION:  NO ACUTE RADIOGRAPHIC FINDING IN THE CHEST.


 














Assessment and Plan





- Diagnosis


(1) Upper gastrointestinal bleed


Is this a current diagnosis for this admission?: Yes   


Plan: 


Hemoglobin dropped down to 7.1.  Will order 2 units of packed RBC.  Surgical has

been consulted for EGD.  Continue IV Protonix.











(2) Anemia due to acute blood loss


Is this a current diagnosis for this admission?: Yes   


Plan: 


As per #1.








(3) Acute kidney injury (nontraumatic)


Is this a current diagnosis for this admission?: Yes   


Plan: 


Creatinine has improved down to 1.3 from 1.6.  Likely a combination of prerenal 

and ATN from hypotension.  continue IV fluids.








- Time


Time Spent with patient: 25-34 minutes

## 2019-10-22 NOTE — PDOC CONSULTATION
Consultation


Consult Date: 10/22/19


Provider Consulted: JENNIE RUBIN


Consult reason:: Upper endoscopy





History of Present Illness


Admission Date/PCP: 


  10/21/19 20:18





  MYRTLE MUSA





History of Present Illness: 


LYNETTE EUGENE is a 78 year old male who has been taking Aleve about 2 tabs 

every other day for the past years for back pains and joint pains attributed to 

osteoarthritis.  He he has been noticing dark stools for the past week.  While 

at the doctor's office the other day patient almost passed out and then 

subsequently brought to ED were hemoglobin was noted to be 9.3.  He was also on 

Plavix supposedly for cardiac reasons and this apparently was stopped about 2 

days ago.  Today repeat hemoglobin was 7.1 and being transfused packed red blood

cells.





He denies any abdominal pains or nausea or vomiting.  He denies any bright red 

stools.  Denies any history of gastric or duodenal ulcers in the past.











Past Medical History


Cardiac Medical History: Reports: Hypertension


   Denies: Coronary Artery Disease, Hyperlipidema


Pulmonary Medical History: 


   Denies: Asthma, Chronic Obstructive Pulmonary Disease (COPD)


EENT Medical History: Reports: Nose - Allergic rhinitis


   Denies: Cataracts, Ears - Hearing aids


Neurological Medical History: 


   Denies: Hemorrhagic CVA, Ischemic CVA, Seizures


Endocrine Medical History: 


   Denies: Diabetes Mellitus Type 1, Diabetes Mellitus Type 2, Hyperthyroidism, 

Hypothyroidism, Obesity


Renal/ Medical History: Reports: Other - Benign prostatic hyperplasia


   Denies: Chronic Kidney Disease, Nephrolithiasis


Malignancy Medical History: Reports: None


GI Medical History: 


   Denies: Cirrhosis, Crohn's Disease, Gastroesophageal Reflux Disease, 

Hepatitis, Peptic Ulcer Disease, Ulcerative Colitis


Musculoskeltal Medical History: Reports: Arthritis - Rheumatoid arthritis, Gout


Skin Medical History: 


   Denies: Eczema, Psoriasis


Psychiatric Medical History: Reports: Depression


   Denies: Alcohol Dependency, Substance Abuse, Tobacco Dependency


Traumatic Medical History: Reports: None


Hematology: Reports: Anemia


   Denies: Bleeding Tendencies


Infectious Medical History: Reports: None





Past Surgical History


Past Surgical History: Reports: Orthopedic Surgery - Neck surgery





Social History


Lives with: Spouse/Significant other


Smoking Status: Never Smoker


Electronic Cigarette use?: No


Frequency of Alcohol Use: Rare


Hx Recreational Drug Use: No


Drugs: None


Hx Prescription Drug Abuse: No





- Advance Directive


Resuscitation Status: Full Code





Family History


Family History: Arthritis, CAD, DM, Hypertension, Malignancy, Other - Peripheral

vascular disease


Parental Family History Reviewed: Yes


Children Family History Reviewed: No


Sibling(s) Family History Reviewed.: No





Medication/Allergy


Home Medications: 








Allopurinol [Zyloprim 300 mg Tablet] 300 mg PO DAILY 10/21/19 


Amlodipine Besylate [Norvasc 5 mg Tablet] 5 mg PO DAILY 10/21/19 


Azelastine HCl 1 spray NS BID 10/21/19 


Clopidogrel Bisulfate [Plavix 75 mg Tablet] 75 mg PO DAILY 10/21/19 


Escitalopram Oxalate [Lexapro 10 mg Tablet] 10 mg PO DAILY 10/21/19 


Folic Acid [Folvite 1 mg Tablet] 1 mg PO DAILY 10/21/19 


Leflunomide [Arava 20 mg Tablet] 20 mg PO DAILY 10/21/19 


Losartan Potassium [Cozaar 50 mg Tablet] 50 mg PO DAILY 10/21/19 


Methotrexate Sodium [Rheumatrex 2.5 mg Tablet] 5 mg PO FR@1000 10/21/19 


Montelukast Sodium [Singulair 10 mg Tablet] 10 mg PO QPM 10/21/19 


Multivitamin [One-A-Day Essential] 1 each PO DAILY 10/21/19 


Tamsulosin HCl [Flomax] 0.4 mg PO BID 10/21/19 


Turmeric Root Extract [Turmeric Curcumin] 500 mg PO DAILY 10/21/19 








Allergies/Adverse Reactions: 


                                        





No Known Allergies Allergy (Verified 05/22/15 10:54)


   











Review of Systems


Constitutional: PRESENT: other - Denies fever no chills


Gastrointestinal: PRESENT: as per HPI





Physical Exam


Vital Signs: 


                                        











Temp Pulse Resp BP Pulse Ox


 


 97.3 F   63   18   154/62 H  98 


 


 10/22/19 15:48  10/22/19 15:48  10/22/19 15:48  10/22/19 15:48  10/22/19 15:48








                                 Intake & Output











 10/21/19 10/22/19 10/23/19





 06:59 06:59 06:59


 


Intake Total  1457 1449


 


Output Total  375 


 


Balance  1082 1449


 


Weight  76.3 kg 76.3 kg











General appearance: PRESENT: no acute distress


Eye exam: PRESENT: conjunctiva pale


Mouth exam: PRESENT: moist


Neck exam: PRESENT: full ROM


Respiratory exam: PRESENT: clear to auscultation luz


Cardiovascular exam: PRESENT: RRR


Pulses: PRESENT: normal radial pulses


Vascular exam: PRESENT: normal capillary refill


GI/Abdominal exam: PRESENT: soft - Nontender


Rectal exam: PRESENT: deferred


Neurological exam: PRESENT: alert, oriented to person, oriented to place, 

oriented to time, oriented to situation


Psychiatric exam: PRESENT: appropriate affect


Skin exam: PRESENT: normal color, warm





Results


Laboratory Results: 


                                        





                                 10/22/19 06:15 





                                 10/22/19 06:15 





                                        











  10/21/19 10/21/19 10/21/19





  14:20 14:20 18:20


 


WBC   


 


RBC   


 


Hgb   


 


Hct   


 


MCV   


 


MCH   


 


MCHC   


 


RDW   


 


Plt Count   


 


Sodium   


 


Potassium   


 


Chloride   


 


Carbon Dioxide   


 


Anion Gap   


 


BUN   


 


Creatinine   


 


Est GFR (African Amer)   


 


Glucose   


 


Calcium   


 


Magnesium   


 


TSH   6.03 H 


 


Free T4   0.85 


 


Free T3 pg/mL   2.82 


 


Urine Color  MARLY  


 


Urine Appearance  SLIGHTLY-CLOUDY  


 


Urine pH  5.0  


 


Ur Specific Gravity  1.020  


 


Urine Protein  NEGATIVE  


 


Urine Glucose (UA)  NEGATIVE  


 


Urine Ketones  TRACE H  


 


Urine Blood  NEGATIVE  


 


Urine Nitrite  NEGATIVE  


 


Ur Leukocyte Esterase  NEGATIVE  


 


Urine WBC (Auto)  3  


 


Urine RBC (Auto)  1  


 


Blood Type    Cancelled


 


Antibody Screen    Cancelled














  10/21/19 10/22/19 10/22/19





  19:04 06:15 06:15


 


WBC   2.7 L D 


 


RBC   2.01 L 


 


Hgb   7.1 L D 


 


Hct   20.9 L 


 


MCV   104 H 


 


MCH   35.4 H 


 


MCHC   34.0 


 


RDW   15.0 H 


 


Plt Count   82 L 


 


Sodium    137.7


 


Potassium    4.1


 


Chloride    109 H


 


Carbon Dioxide    23


 


Anion Gap    6


 


BUN    23 H


 


Creatinine    1.31 H


 


Est GFR ( Amer)    > 60


 


Glucose    94


 


Calcium    8.3 L


 


Magnesium    1.9


 


TSH   


 


Free T4   


 


Free T3 pg/mL   


 


Urine Color   


 


Urine Appearance   


 


Urine pH   


 


Ur Specific Gravity   


 


Urine Protein   


 


Urine Glucose (UA)   


 


Urine Ketones   


 


Urine Blood   


 


Urine Nitrite   


 


Ur Leukocyte Esterase   


 


Urine WBC (Auto)   


 


Urine RBC (Auto)   


 


Blood Type  A POSITIVE  


 


Antibody Screen  NEGATIVE  








                                        











  10/21/19 10/21/19 10/21/19





  14:20 14:20 18:20


 


Creatine Kinase  110  


 


CK-MB (CK-2)   1.10 


 


Troponin I   0.045  0.035














  10/21/19 10/21/19 10/22/19





  18:20 18:20 00:39


 


Creatine Kinase  98  


 


CK-MB (CK-2)   0.88  1.04


 


Troponin I   Cancelled  0.050














  10/22/19 10/22/19 10/22/19





  00:39 06:15 06:15


 


Creatine Kinase  98  108 


 


CK-MB (CK-2)    1.05


 


Troponin I    0.047














  10/22/19 10/22/19





  12:46 12:46


 


Creatine Kinase  126 


 


CK-MB (CK-2)   1.43


 


Troponin I   0.038











Impressions: 


                                        





Chest X-Ray  10/21/19 13:51


IMPRESSION:  NO ACUTE RADIOGRAPHIC FINDING IN THE CHEST.


 














Assessment & Plan





- Diagnosis


(1) Anemia due to acute blood loss


Is this a current diagnosis for this admission?: Yes   





(2) Postural hypotension


Is this a current diagnosis for this admission?: Yes   





(3) Upper gastrointestinal bleed


Is this a current diagnosis for this admission?: Yes   





- Time


Time Spent: 30 to 50 Minutes





- Inpatient Certification


Medical Necessity: Need Close Monitoring Due to Risk of Patient Decompensation, 

Need For IV Fluids





- Plan Summary


Plan Summary: 





Hold Aleve and Plavix


Keep n.p.o. from midnight


For possible EGD tomorrow. I'll ask  to do the EGD

## 2019-10-23 LAB
ERYTHROCYTE [DISTWIDTH] IN BLOOD BY AUTOMATED COUNT: 18.9 % (ref 11.5–14)
HCT VFR BLD CALC: 27.9 % (ref 37.9–51)
HGB BLD-MCNC: 9.7 G/DL (ref 13.5–17)
MCH RBC QN AUTO: 33.4 PG (ref 27–33.4)
MCHC RBC AUTO-ENTMCNC: 34.6 G/DL (ref 32–36)
MCV RBC AUTO: 97 FL (ref 80–97)
PLATELET # BLD: 73 10^3/UL (ref 150–450)
RBC # BLD AUTO: 2.89 10^6/UL (ref 4.35–5.55)
WBC # BLD AUTO: 3.3 10^3/UL (ref 4–10.5)

## 2019-10-23 RX ADMIN — Medication SCH: at 15:42

## 2019-10-23 RX ADMIN — TAMSULOSIN HYDROCHLORIDE SCH MG: 0.4 CAPSULE ORAL at 12:07

## 2019-10-23 RX ADMIN — SUCRALFATE SCH: 1 TABLET ORAL at 12:03

## 2019-10-23 RX ADMIN — MULTIVITAMIN TABLET SCH TAB: TABLET at 12:07

## 2019-10-23 RX ADMIN — MONTELUKAST SODIUM SCH MG: 10 TABLET, FILM COATED ORAL at 18:33

## 2019-10-23 RX ADMIN — SUCRALFATE SCH GM: 1 TABLET ORAL at 18:33

## 2019-10-23 RX ADMIN — DOCUSATE SODIUM SCH MG: 50 LIQUID ORAL at 18:33

## 2019-10-23 RX ADMIN — Medication SCH ML: at 21:57

## 2019-10-23 RX ADMIN — ESCITALOPRAM OXALATE SCH MG: 10 TABLET, FILM COATED ORAL at 12:07

## 2019-10-23 RX ADMIN — SUCRALFATE SCH GM: 1 TABLET ORAL at 12:06

## 2019-10-23 RX ADMIN — SODIUM CHLORIDE, SODIUM LACTATE, POTASSIUM CHLORIDE, AND CALCIUM CHLORIDE PRN MLS/HR: .6; .31; .03; .02 INJECTION, SOLUTION INTRAVENOUS at 01:09

## 2019-10-23 RX ADMIN — SUCRALFATE SCH GM: 1 TABLET ORAL at 21:56

## 2019-10-23 RX ADMIN — DOCUSATE SODIUM SCH: 50 LIQUID ORAL at 12:03

## 2019-10-23 RX ADMIN — ALLOPURINOL SCH MG: 300 TABLET ORAL at 12:07

## 2019-10-23 RX ADMIN — Medication SCH: at 05:26

## 2019-10-23 RX ADMIN — TAMSULOSIN HYDROCHLORIDE SCH MG: 0.4 CAPSULE ORAL at 18:33

## 2019-10-23 RX ADMIN — PANTOPRAZOLE SODIUM SCH MG: 40 INJECTION, POWDER, FOR SOLUTION INTRAVENOUS at 21:57

## 2019-10-23 RX ADMIN — FOLIC ACID SCH MG: 1 TABLET ORAL at 12:07

## 2019-10-23 RX ADMIN — PANTOPRAZOLE SODIUM SCH MG: 40 INJECTION, POWDER, FOR SOLUTION INTRAVENOUS at 12:07

## 2019-10-23 NOTE — PDOC PROGRESS REPORT
Subjective


Progress Note for:: 10/23/19


Subjective:: 





This is a 78-year-old male with symptomatic anemia and melena.  The patient's 

last colonoscopy was in the 1980s.  He reports orthostasis, fatigue, and 

malaise.  He denies chest pain, shortness of breath, fevers, chills, nausea, or 

vomiting.  He has recently had a melanotic stool.  He denies abdominal pain


Reason For Visit: 


ACUTE UPPER GI BLEEDING,POSTURAL HYPOTENSION,








Physical Exam


Vital Signs: 


                                        











Temp Pulse Resp BP Pulse Ox


 


 97.7 F   63   18   173/72 H  96 


 


 10/23/19 08:12  10/23/19 14:00  10/23/19 08:12  10/23/19 08:12  10/23/19 08:12








                                 Intake & Output











 10/22/19 10/23/19 10/24/19





 06:59 06:59 06:59


 


Intake Total 1457 5199 720


 


Output Total 375 700 


 


Balance 1082 4499 720


 


Weight 76.3 kg 76.2 kg 











General appearance: PRESENT: no acute distress, cooperative


Head exam: PRESENT: atraumatic, normocephalic


Eye exam: PRESENT: EOMI, PERRLA.  ABSENT: scleral icterus


Mouth exam: PRESENT: moist, neck supple


Neck exam: ABSENT: meningismus, tenderness, thyromegaly, tracheal deviation


Respiratory exam: PRESENT: unlabored.  ABSENT: chest wall tenderness, tachypnea,

wheezes


Pulses: PRESENT: normal radial pulses


Vascular exam: PRESENT: pallor - Mild


GI/Abdominal exam: PRESENT: soft.  ABSENT: distended, tenderness


Rectal exam: PRESENT: deferred


Extremities exam: ABSENT: clubbing


Musculoskeletal exam: ABSENT: deformity


Neurological exam: PRESENT: alert, awake, oriented to person, oriented to place,

oriented to time, oriented to situation, CN II-XII grossly intact


Psychiatric exam: ABSENT: agitated, anxious, depressed


Focused psych exam: ABSENT: delusional


Skin exam: ABSENT: cyanosis, erythema, jaundice





Results


Laboratory Results: 


                                        





                                 10/23/19 06:23 





                                 10/22/19 06:15 





                                        











  10/22/19 10/23/19





  18:45 06:23


 


WBC  3.1 L  3.3 L


 


RBC  2.88 L  2.89 L


 


Hgb  9.5 L D  9.7 L


 


Hct  27.8 L  27.9 L


 


MCV  97  D  97


 


MCH  33.2  33.4


 


MCHC  34.3  34.6


 


RDW  18.7 H  18.9 H


 


Plt Count  74 L  73 L


 


Seg Neutrophils %  49.0 








                                        











  10/21/19 10/21/19 10/21/19





  14:20 14:20 18:20


 


Creatine Kinase  110  


 


CK-MB (CK-2)   1.10 


 


Troponin I   0.045  0.035














  10/21/19 10/21/19 10/22/19





  18:20 18:20 00:39


 


Creatine Kinase  98  


 


CK-MB (CK-2)   0.88  1.04


 


Troponin I   Cancelled  0.050














  10/22/19 10/22/19 10/22/19





  00:39 06:15 06:15


 


Creatine Kinase  98  108 


 


CK-MB (CK-2)    1.05


 


Troponin I    0.047














  10/22/19 10/22/19





  12:46 12:46


 


Creatine Kinase  126 


 


CK-MB (CK-2)   1.43


 


Troponin I   0.038











Impressions: 


                                        





Chest X-Ray  10/21/19 13:51


IMPRESSION:  NO ACUTE RADIOGRAPHIC FINDING IN THE CHEST.


 














Assessment & Plan





- Diagnosis


(1) Melena


Is this a current diagnosis for this admission?: Yes   





(2) Anemia due to acute blood loss


Is this a current diagnosis for this admission?: Yes   





- Time


Time Spent with patient: Less than 15 minutes





- Plan Summary


Plan Summary: 





This is a 78-year-old male with melena and symptomatic anemia.  The patient has 

not had a colonoscopy since the 1980s.  Due to his symptomatic anemia and 

melena, his work-up should include colonoscopy and EGD.  The patient has not had

any hematochezia or hematemesis.  I have discussed the plan with the patient at 

length.  He is in agreement.  Plan for bowel prep today.  Plan for EGD and 

colonoscopy tomorrow.  Continue with a PPI for now.

## 2019-10-23 NOTE — PDOC PROGRESS REPORT
Subjective


Progress Note for:: 10/23/19


Subjective:: 


This is a 78-year-old male who presented with a few week history of melanotic 

stools, lightheadedness and shortness of breath.  He was found to be anemic and 

was found to have a positive FOBT.  He was admitted for likely upper GI bleed.





10/22: This morning, patient appears comfortable.  He denies abdominal pain.  He

does report that he takes a lot of Aleve at home for his joint pains.  

Hemoglobin dropped down to 7.1.  Will order 2 units of packed RBC.  Surgical has

been consulted for EGD.








10/23: Patient reports he had an episode of black tarry stool overnight.  He 

also complained of urinary frequency last night.  Denies chest pain, shortness 

of breath or dizziness.  Hemoglobin has improved and remained stable after 

transfusion.  He is scheduled for an EGD and colonoscopy tomorrow by surgery.


Reason For Visit: 


ACUTE UPPER GI BLEEDING,POSTURAL HYPOTENSION,








Physical Exam


Vital Signs: 


                                        











Temp Pulse Resp BP Pulse Ox


 


 97.7 F   66   18   173/72 H  96 


 


 10/23/19 08:12  10/23/19 08:12  10/23/19 08:12  10/23/19 08:12  10/23/19 08:12








                                 Intake & Output











 10/22/19 10/23/19 10/24/19





 06:59 06:59 06:59


 


Intake Total 1457 5199 360


 


Output Total 375 700 


 


Balance 1082 4499 360


 


Weight 168 lb 3.403 oz 167 lb 15.876 oz 











General appearance: PRESENT: no acute distress, well-developed, well-nourished


Head exam: PRESENT: atraumatic, normocephalic


Eye exam: PRESENT: conjunctiva pink, EOMI, PERRLA.  ABSENT: scleral icterus


Ear exam: PRESENT: normal external ear exam


Mouth exam: PRESENT: moist, tongue midline


Neck exam: ABSENT: carotid bruit, JVD, lymphadenopathy, thyromegaly


Respiratory exam: PRESENT: clear to auscultation luz.  ABSENT: rales, rhonchi, 

wheezes


Cardiovascular exam: PRESENT: RRR.  ABSENT: rubs


Pulses: PRESENT: normal dorsalis pedis pul


GI/Abdominal exam: PRESENT: normal bowel sounds, soft.  ABSENT: distended, 

guarding, mass, organolmegaly, rebound, tenderness


Rectal exam: PRESENT: deferred


Neurological exam: PRESENT: alert, awake, oriented to person, oriented to place,

oriented to time, oriented to situation, CN II-XII grossly intact.  ABSENT: 

motor sensory deficit





Results


Laboratory Results: 


                                        





                                 10/23/19 06:23 





                                 10/22/19 06:15 





                                        











  10/22/19 10/23/19





  18:45 06:23


 


WBC  3.1 L  3.3 L


 


RBC  2.88 L  2.89 L


 


Hgb  9.5 L D  9.7 L


 


Hct  27.8 L  27.9 L


 


MCV  97  D  97


 


MCH  33.2  33.4


 


MCHC  34.3  34.6


 


RDW  18.7 H  18.9 H


 


Plt Count  74 L  73 L


 


Seg Neutrophils %  49.0 








                                        











  10/21/19 10/21/19 10/21/19





  14:20 14:20 18:20


 


Creatine Kinase  110  


 


CK-MB (CK-2)   1.10 


 


Troponin I   0.045  0.035














  10/21/19 10/21/19 10/22/19





  18:20 18:20 00:39


 


Creatine Kinase  98  


 


CK-MB (CK-2)   0.88  1.04


 


Troponin I   Cancelled  0.050














  10/22/19 10/22/19 10/22/19





  00:39 06:15 06:15


 


Creatine Kinase  98  108 


 


CK-MB (CK-2)    1.05


 


Troponin I    0.047














  10/22/19 10/22/19





  12:46 12:46


 


Creatine Kinase  126 


 


CK-MB (CK-2)   1.43


 


Troponin I   0.038











Impressions: 


                                        





Chest X-Ray  10/21/19 13:51


IMPRESSION:  NO ACUTE RADIOGRAPHIC FINDING IN THE CHEST.


 














Assessment and Plan





- Diagnosis


(1) Upper gastrointestinal bleed


Is this a current diagnosis for this admission?: Yes   


Plan: 


10/22: Hemoglobin dropped down to 7.1.  Will order 2 units of packed RBC.  

Surgical has been consulted for EGD.  Continue IV Protonix.





10/23: Hemoglobin has trended up and stabilized at 9.7.  Surgery has scheduled 

patient for EGD: This could be tomorrow.








(2) Anemia due to acute blood loss


Is this a current diagnosis for this admission?: Yes   


Plan: 


As per #1.








(3) Acute kidney injury (nontraumatic)


Is this a current diagnosis for this admission?: Yes   


Plan: 


Improving with IV fluids.  Likely a combination of prerenal and ATN from 

hypotension.

## 2019-10-24 LAB
ADD MANUAL DIFF: NO
ANION GAP SERPL CALC-SCNC: 6 MMOL/L (ref 5–19)
BASOPHILS # BLD AUTO: 0 10^3/UL (ref 0–0.2)
BASOPHILS NFR BLD AUTO: 0.5 % (ref 0–2)
BUN SERPL-MCNC: 14 MG/DL (ref 7–20)
CALCIUM: 8.3 MG/DL (ref 8.4–10.2)
CHLORIDE SERPL-SCNC: 109 MMOL/L (ref 98–107)
CO2 SERPL-SCNC: 24 MMOL/L (ref 22–30)
EOSINOPHIL # BLD AUTO: 0.7 10^3/UL (ref 0–0.6)
EOSINOPHIL NFR BLD AUTO: 19.5 % (ref 0–6)
ERYTHROCYTE [DISTWIDTH] IN BLOOD BY AUTOMATED COUNT: 18.4 % (ref 11.5–14)
ERYTHROCYTE [DISTWIDTH] IN BLOOD BY AUTOMATED COUNT: 18.9 % (ref 11.5–14)
GLUCOSE SERPL-MCNC: 85 MG/DL (ref 75–110)
HCT VFR BLD CALC: 26.2 % (ref 37.9–51)
HCT VFR BLD CALC: 27.7 % (ref 37.9–51)
HGB BLD-MCNC: 9 G/DL (ref 13.5–17)
HGB BLD-MCNC: 9.4 G/DL (ref 13.5–17)
LYMPHOCYTES # BLD AUTO: 0.6 10^3/UL (ref 0.5–4.7)
LYMPHOCYTES NFR BLD AUTO: 17.3 % (ref 13–45)
MCH RBC QN AUTO: 33.4 PG (ref 27–33.4)
MCH RBC QN AUTO: 33.4 PG (ref 27–33.4)
MCHC RBC AUTO-ENTMCNC: 33.9 G/DL (ref 32–36)
MCHC RBC AUTO-ENTMCNC: 34.2 G/DL (ref 32–36)
MCV RBC AUTO: 98 FL (ref 80–97)
MCV RBC AUTO: 98 FL (ref 80–97)
MONOCYTES # BLD AUTO: 0.4 10^3/UL (ref 0.1–1.4)
MONOCYTES NFR BLD AUTO: 11.6 % (ref 3–13)
NEUTROPHILS # BLD AUTO: 1.8 10^3/UL (ref 1.7–8.2)
NEUTS SEG NFR BLD AUTO: 51.1 % (ref 42–78)
PLATELET # BLD: 74 10^3/UL (ref 150–450)
PLATELET # BLD: 77 10^3/UL (ref 150–450)
POTASSIUM SERPL-SCNC: 3.6 MMOL/L (ref 3.6–5)
RBC # BLD AUTO: 2.69 10^6/UL (ref 4.35–5.55)
RBC # BLD AUTO: 2.82 10^6/UL (ref 4.35–5.55)
TOTAL CELLS COUNTED % (AUTO): 100 %
WBC # BLD AUTO: 3.3 10^3/UL (ref 4–10.5)
WBC # BLD AUTO: 3.5 10^3/UL (ref 4–10.5)

## 2019-10-24 PROCEDURE — 0DB78ZX EXCISION OF STOMACH, PYLORUS, VIA NATURAL OR ARTIFICIAL OPENING ENDOSCOPIC, DIAGNOSTIC: ICD-10-PCS | Performed by: SURGERY

## 2019-10-24 PROCEDURE — 0DB58ZX EXCISION OF ESOPHAGUS, VIA NATURAL OR ARTIFICIAL OPENING ENDOSCOPIC, DIAGNOSTIC: ICD-10-PCS | Performed by: SURGERY

## 2019-10-24 PROCEDURE — 0DJD8ZZ INSPECTION OF LOWER INTESTINAL TRACT, VIA NATURAL OR ARTIFICIAL OPENING ENDOSCOPIC: ICD-10-PCS | Performed by: SURGERY

## 2019-10-24 RX ADMIN — MULTIVITAMIN TABLET SCH TAB: TABLET at 10:44

## 2019-10-24 RX ADMIN — MONTELUKAST SODIUM SCH MG: 10 TABLET, FILM COATED ORAL at 17:57

## 2019-10-24 RX ADMIN — ALLOPURINOL SCH MG: 300 TABLET ORAL at 10:45

## 2019-10-24 RX ADMIN — PANTOPRAZOLE SODIUM SCH MG: 40 INJECTION, POWDER, FOR SOLUTION INTRAVENOUS at 10:44

## 2019-10-24 RX ADMIN — PANTOPRAZOLE SODIUM SCH MG: 40 INJECTION, POWDER, FOR SOLUTION INTRAVENOUS at 21:12

## 2019-10-24 RX ADMIN — SUCRALFATE SCH GM: 1 TABLET ORAL at 17:57

## 2019-10-24 RX ADMIN — ESCITALOPRAM OXALATE SCH MG: 10 TABLET, FILM COATED ORAL at 10:44

## 2019-10-24 RX ADMIN — FOLIC ACID SCH MG: 1 TABLET ORAL at 10:44

## 2019-10-24 RX ADMIN — SUCRALFATE SCH GM: 1 TABLET ORAL at 08:53

## 2019-10-24 RX ADMIN — Medication SCH: at 13:01

## 2019-10-24 RX ADMIN — SUCRALFATE SCH GM: 1 TABLET ORAL at 10:44

## 2019-10-24 RX ADMIN — DOCUSATE SODIUM SCH: 50 LIQUID ORAL at 10:45

## 2019-10-24 RX ADMIN — SODIUM CHLORIDE, SODIUM LACTATE, POTASSIUM CHLORIDE, AND CALCIUM CHLORIDE PRN MLS/HR: .6; .31; .03; .02 INJECTION, SOLUTION INTRAVENOUS at 05:36

## 2019-10-24 RX ADMIN — TAMSULOSIN HYDROCHLORIDE SCH MG: 0.4 CAPSULE ORAL at 10:44

## 2019-10-24 RX ADMIN — SUCRALFATE SCH GM: 1 TABLET ORAL at 21:12

## 2019-10-24 RX ADMIN — ACETAMINOPHEN PRN MG: 325 TABLET ORAL at 21:11

## 2019-10-24 RX ADMIN — DOCUSATE SODIUM SCH: 50 LIQUID ORAL at 17:58

## 2019-10-24 RX ADMIN — Medication SCH ML: at 05:36

## 2019-10-24 RX ADMIN — Medication SCH ML: at 21:12

## 2019-10-24 RX ADMIN — TAMSULOSIN HYDROCHLORIDE SCH MG: 0.4 CAPSULE ORAL at 17:57

## 2019-10-24 NOTE — PDOC TRANSFER SUMMARY
General


Admission Date/PCP: 


  10/21/19 20:18





  MYRTLE MUSA





Resuscitation Status: Full Code





- Transfer Diagnosis


(1) Upper gastrointestinal bleed


Is this a current diagnosis for this admission?: Yes   





(2) Anemia due to acute blood loss


Is this a current diagnosis for this admission?: Yes   





(3) Acute kidney injury (nontraumatic)


Is this a current diagnosis for this admission?: Yes   





(4) Aortic stenosis


Is this a current diagnosis for this admission?: Yes   





- Transfer Medications


Home Medications: 


                                        





Allopurinol [Zyloprim 300 mg Tablet] 300 mg PO DAILY 10/21/19 


Amlodipine Besylate [Norvasc 5 mg Tablet] 5 mg PO DAILY 10/21/19 


Azelastine HCl 1 spray NS BID 10/21/19 


Clopidogrel Bisulfate [Plavix 75 mg Tablet] 75 mg PO DAILY 10/21/19 


Escitalopram Oxalate [Lexapro 10 mg Tablet] 10 mg PO DAILY 10/21/19 


Folic Acid [Folvite 1 mg Tablet] 1 mg PO DAILY 10/21/19 


Leflunomide [Arava 20 mg Tablet] 20 mg PO DAILY 10/21/19 


Losartan Potassium [Cozaar 50 mg Tablet] 50 mg PO DAILY 10/21/19 


Methotrexate Sodium [Rheumatrex 2.5 mg Tablet] 5 mg PO FR@1000 10/21/19 


Montelukast Sodium [Singulair 10 mg Tablet] 10 mg PO QPM 10/21/19 


Multivitamin [One-A-Day Essential] 1 each PO DAILY 10/21/19 


Tamsulosin HCl [Flomax] 0.4 mg PO BID 10/21/19 


Turmeric Root Extract [Turmeric Curcumin] 500 mg PO DAILY 10/21/19 








Transfer Medications: 


                               Current Medications





Acetaminophen (Tylenol 325 Mg Tablet)  650 mg PO Q4HP PRN


   PRN Reason: For headache, pain or fever


   Stop: 11/20/19 20:20


   Last Admin: 10/21/19 23:55 Dose:  650 mg


   Documented by: 


Acetaminophen (Tylenol 650 Mg Supp)  650 mg W[place] Q4HP PRN


   PRN Reason: For headache, pain or fever


   Stop: 11/20/19 20:20


Al Hydrox/Mg Hydrox/Simethicone (Maalox Plus Susp 30 Udcup)  30 ml PO Q6HP PRN


   PRN Reason: HEARTBURN


   Stop: 11/20/19 20:13


Allopurinol (Zyloprim 300 Mg Tablet)  300 mg PO DAILY GAYATHRI


   Stop: 11/21/19 09:59


   Last Admin: 10/24/19 10:45 Dose:  300 mg


   Documented by: 


Docusate Sodium (Colace Udc 100 Mg/10 Ml Oral Soln)  100 mg PO BID GAYATHRI


   Stop: 11/21/19 09:59


   Last Admin: 10/24/19 10:45 Dose:  Not Given


   Documented by: 


Escitalopram Oxalate (Lexapro 10 Mg Tablet)  10 mg PO DAILY GAYATHRI


   Stop: 11/21/19 09:59


   Last Admin: 10/24/19 10:44 Dose:  10 mg


   Documented by: 


Folic Acid (Folvite 1 Mg Tablet)  1 mg PO DAILY GAYATHRI


   Stop: 11/21/19 09:59


   Last Admin: 10/24/19 10:44 Dose:  1 mg


   Documented by: 


Lactated Ringer's (Lactated Ringers 1000 Ml Iv Soln)  1,000 mls @ 167 mls/hr IV 

CONTINUOUS PRN


   PRN Reason: THIS MED IS NOT "PRN"


   Stop: 11/20/19 20:13


   Last Admin: 10/24/19 05:36 Dose:  167 mls/hr


   Documented by: 


Magnesium Hydroxide (Milk Of Magnesia 30 Ml Udcup)  30 ml PO HSP PRN


   PRN Reason: FOR CONSTIPATION


   Stop: 11/20/19 20:13


Montelukast Sodium (Singulair 10 Mg Tablet)  10 mg PO QPM GAYATHRI


   Stop: 11/21/19 17:59


   Last Admin: 10/23/19 18:33 Dose:  10 mg


   Documented by: 


Multivitamins (Tab-A-Vikas (Multiple Vitamin) Tablet)  1 tab PO DAILY GAYATHRI


   Stop: 11/21/19 09:59


   Last Admin: 10/24/19 10:44 Dose:  1 tab


   Documented by: 


Nalbuphine HCl (Nubain Inj 10 Mg/1 Ml Ampule)  0 mg IV Q3H PRN; Protocol


   PRN Reason: FOR PAIN


   Stop: 10/28/19 20:20


Pantoprazole Sodium (Protonix Iv Inj 40 Mg Vial)  40 mg IV Q12 GAYATHRI


   Stop: 10/28/19 21:59


   Last Admin: 10/24/19 10:44 Dose:  40 mg


   Documented by: 


Promethazine HCl (Phenergan Inj 25 Mg/1 Ml Vial)  12.5 mg IV Q4HP PRN


   PRN Reason: UNRESOLVED NAUSEA/VOMITING


   Stop: 11/20/19 20:23


Sodium Chloride (Saline Flush 2.5 Ml Monoject Prefil Syrin)  2.5 ml IV Q8 GAYATHRI


   Stop: 11/20/19 21:59


   Last Admin: 10/24/19 13:01 Dose:  Not Given


   Documented by: 


Sucralfate (Carafate 1 Gm Tablet)  1 gm PO ACHS GAYATHRI


   Stop: 11/20/19 21:59


   Last Admin: 10/24/19 10:44 Dose:  1 gm


   Documented by: 


Tamsulosin HCl (Flomax 0.4 Mg Cap.Sr)  0.4 mg PO BID GAYATHRI


   Stop: 11/21/19 09:59


   Last Admin: 10/24/19 10:44 Dose:  0.4 mg


   Documented by: 











- Allergies


Allergies/Adverse Reactions: 


                                        





No Known Allergies Allergy (Verified 05/22/15 10:54)


   











Hospital Course


Hospital Course: 


This 78-year-old male with past medical history of hypertension, BPH and 

rheumatoid arthritis on methotrexate who presented with multiple episodes of 

melanotic stools. 





Patient was admitted for possible upper GI bleed.  He was started on IV Protonix

and Carafate.  Surgical service was consulted.  He underwent EGD and colonoscopy

which did not identify any definite source of bleeding.  He did have old blood 

noted on the colon during endoscopy.  His hemoglobin dropped from 9.2 to 7.1.  

He received 2 units of packed RBC.  Hemoglobin after transfusion improved to 9.7

but went down to 9.0 again earlier today.  He continued to have black tarry 

stools.  He was also noted to have a systolic murmur suspicious for AS.  An e

chocardiogram was pursued and did confirm he has moderate aortic stenosis.  

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 Suspect small intestine as 

source of possible bleeding.  Patient did report he does take occasional Aleve 

at home for his arthritis.





Patient will be transferred to [facility] for possible push 

enteroscopy.  Discussed with [facility] hospitalist, Dr. Fajardo who accepted the 

transfer.








Physical Exam


Vital Signs: 


                                        











Temp Pulse Resp BP Pulse Ox


 


 97.9 F   69   17   144/51 H  96 


 


 10/24/19 08:25  10/24/19 13:27  10/24/19 08:25  10/24/19 08:25  10/24/19 08:25








                                 Intake & Output











 10/23/19 10/24/19 10/25/19





 06:59 06:59 06:59


 


Intake Total 5199 1720 640


 


Output Total 700  


 


Balance 4499 1720 640


 


Weight 167 lb 15.876 oz 164 lb 14.492 oz 











General appearance: PRESENT: no acute distress, well-developed, well-nourished


Head exam: PRESENT: atraumatic, normocephalic


Eye exam: PRESENT: conjunctiva pink, EOMI, PERRLA.  ABSENT: scleral icterus


Ear exam: PRESENT: normal external ear exam


Mouth exam: PRESENT: moist, tongue midline


Neck exam: ABSENT: carotid bruit, JVD, lymphadenopathy, thyromegaly


Respiratory exam: PRESENT: clear to auscultation luz.  ABSENT: rales, rhonchi, 

wheezes


Pulses: PRESENT: normal dorsalis pedis pul


GI/Abdominal exam: PRESENT: normal bowel sounds, soft.  ABSENT: distended, 

guarding, mass, organolmegaly, rebound, tenderness


Rectal exam: PRESENT: deferred


Extremities exam: PRESENT: full ROM.  ABSENT: calf tenderness, clubbing, pedal 

edema


Neurological exam: PRESENT: alert, awake, oriented to person, oriented to place,

oriented to time, oriented to situation, CN II-XII grossly intact.  ABSENT: 

motor sensory deficit





Results


Laboratory Results: 


                                        





                                 10/24/19 05:54 





                                 10/24/19 05:54 





                                        











  10/24/19 10/24/19





  05:54 05:54


 


WBC  3.3 L 


 


RBC  2.69 L 


 


Hgb  9.0 L 


 


Hct  26.2 L 


 


MCV  98 H 


 


MCH  33.4 


 


MCHC  34.2 


 


RDW  18.4 H 


 


Plt Count  74 L 


 


Sodium   138.5


 


Potassium   3.6


 


Chloride   109 H


 


Carbon Dioxide   24


 


Anion Gap   6


 


BUN   14


 


Creatinine   1.18


 


Est GFR (African Amer)   > 60


 


Glucose   85


 


Calcium   8.3 L








                                        











  10/21/19 10/21/19 10/21/19





  14:20 14:20 18:20


 


Creatine Kinase  110  


 


CK-MB (CK-2)   1.10 


 


Troponin I   0.045  0.035














  10/21/19 10/21/19 10/22/19





  18:20 18:20 00:39


 


Creatine Kinase  98  


 


CK-MB (CK-2)   0.88  1.04


 


Troponin I   Cancelled  0.050














  10/22/19 10/22/19 10/22/19





  00:39 06:15 06:15


 


Creatine Kinase  98  108 


 


CK-MB (CK-2)    1.05


 


Troponin I    0.047














  10/22/19 10/22/19





  12:46 12:46


 


Creatine Kinase  126 


 


CK-MB (CK-2)   1.43


 


Troponin I   0.038











Impressions: 


                                        





Chest X-Ray  10/21/19 13:51


IMPRESSION:  NO ACUTE RADIOGRAPHIC FINDING IN THE CHEST.

## 2019-10-24 NOTE — PDOC PROGRESS REPORT
Subjective


Subjective:: 





This is a 78-year-old male with symptomatic anemia and melena.  The patient's 

last colonoscopy was in the 1980s.  He reports orthostasis, fatigue, and 

malaise.  He denies chest pain, shortness of breath, fevers, chills, nausea, or 

vomiting.  He has recently had a melanotic stool.  He denies abdominal pain.  He

has completed his bowel prep.  He received 2 units of packed red blood cells 

yesterday.


Reason For Visit: 


ACUTE UPPER GI BLEEDING,POSTURAL HYPOTENSION,








Physical Exam


Vital Signs: 


                                        











Temp Pulse Resp BP Pulse Ox


 


 97.7 F   100   18   139/65 H  96 


 


 10/23/19 08:12  10/24/19 07:00  10/23/19 08:12  10/24/19 04:00  10/23/19 08:12








                                 Intake & Output











 10/23/19 10/24/19 10/25/19





 06:59 06:59 06:59


 


Intake Total 5199 1720 


 


Output Total 700  


 


Balance 4499 1720 


 


Weight 76.2 kg 74.8 kg 














Results


Laboratory Results: 


                                        





                                 10/24/19 05:54 





                                 10/24/19 05:54 





                                        











  10/24/19 10/24/19





  05:54 05:54


 


WBC  3.3 L 


 


RBC  2.69 L 


 


Hgb  9.0 L 


 


Hct  26.2 L 


 


MCV  98 H 


 


MCH  33.4 


 


MCHC  34.2 


 


RDW  18.4 H 


 


Plt Count  74 L 


 


Sodium   138.5


 


Potassium   3.6


 


Chloride   109 H


 


Carbon Dioxide   24


 


Anion Gap   6


 


BUN   14


 


Creatinine   1.18


 


Est GFR (African Amer)   > 60


 


Glucose   85


 


Calcium   8.3 L








                                        











  10/21/19 10/21/19 10/21/19





  14:20 14:20 18:20


 


Creatine Kinase  110  


 


CK-MB (CK-2)   1.10 


 


Troponin I   0.045  0.035














  10/21/19 10/21/19 10/22/19





  18:20 18:20 00:39


 


Creatine Kinase  98  


 


CK-MB (CK-2)   0.88  1.04


 


Troponin I   Cancelled  0.050














  10/22/19 10/22/19 10/22/19





  00:39 06:15 06:15


 


Creatine Kinase  98  108 


 


CK-MB (CK-2)    1.05


 


Troponin I    0.047














  10/22/19 10/22/19





  12:46 12:46


 


Creatine Kinase  126 


 


CK-MB (CK-2)   1.43


 


Troponin I   0.038











Impressions: 


                                        





Chest X-Ray  10/21/19 13:51


IMPRESSION:  NO ACUTE RADIOGRAPHIC FINDING IN THE CHEST.


 














Assessment & Plan





- Diagnosis


(1) Melena


Is this a current diagnosis for this admission?: Yes   





(2) Anemia due to acute blood loss


Is this a current diagnosis for this admission?: Yes   





- Time


Time Spent with patient: Less than 15 minutes





- Plan Summary


Plan Summary: 





Plan for EGD and colonoscopy today for symptomatic anemia and melena.  

Risks/benefits discussed, informed consent obtained, and all questions answered.

## 2019-10-24 NOTE — XCELERA REPORT
[address] [address]

                               Tel: [phone]

                               Fax: [phone]



                      Transthoracic Echocardiogram Report

_______________________________________________________________________________



Name: LYNETTE EUEGNE

MRN: [id number]                           Age: 78 yrs

Gender: Male                              : 1940

Patient Status: Inpatient                 Patient Location: [address] [place] [address]

Account #: [id number]

Study Date: 10/23/2019 06:33 PM

Accession #: [id number]

_______________________________________________________________________________



Height: 71 in        Weight: 167 lb        BSA: 2.0 m2

_______________________________________________________________________________

Procedure: A two-dimensional transthoracic echocardiogram with color flow and

Doppler was performed. Study Quality: Fair.

Reason For Study: systolic murmur, GI bleed, assess for AS



History: systolic murmur, GI bleed, assess for AS.

Ordering Physician: IGNACIO DE LA FUENTE



Performed By: Emerald Oliva

_______________________________________________________________________________



Interpretation Summary

The left ventricle is normal in size.

There is normal left ventricular wall thickness.

LV EF is 65%

Left ventricular systolic function is normal.

Doppler measurements suggest impaired left ventricular relaxation, which is

associated with grade I/IV or mild diastolic dysfunction

The left ventricular wall motion is normal.

No ASD,VSD , or PFO.

There is no thrombus.

The right ventricle is normal in size and function.

The right atrium is normal.

The left atrial size is normal.

There is mild mitral annular calcification.

There is no evidence of mitral valve prolapse.

There is no vegetation seen on the mitral valve.

There is no mitral valve stenosis.

There is a trace amount of mitral regurgitation

There is no aortic valvular vegetation.

There is moderate aortic stenosis

There is a peak gradient of 39.9 mm of Hg , and a mean gradient of 23.8 mm of

Hg.

There is no LVOT obstruction.

There is a mild amount of aortic regurgitation

There is no tricuspid valve prolapse.

There is no tricuspid stenosis.

There is a trace amount of tricuspid regurgitation

There is mild pulmonary hypertension by echo

RVSP is 39 to 44 mm of Hg , with A mean of 5 to 10.

There is no pulmonic valvular stenosis.

There is a trace amount of pulmonic regurgitation

The aortic root is normal size.

The inferior vena cava appeared normal and decreased > 50% with respiration

(RAP 5-10 mmHg)

There is no pericardial effusion.



MMode/2D Measurements & Calculations

RVDd: 2.4 cm  LVIDd: 4.9 cm   FS: 37.2 %             Ao root diam: 3.1 cm



IVSd: 1.1 cm  LVIDs: 3.1 cm   EDV(Teich): 113.8 ml   Ao root area: 7.7 cm2

              LVPWd: 1.0 cm   ESV(Teich): 37.5 ml    LA dimension: 3.8 cm

                              EF(Teich): 67.0 %



Doppler Measurements & Calculations

MV E max linh:      MV P1/2t max linh:     Ao V2 max:        LV V1 max P.6 cm/sec        89.0 cm/sec           316.0 cm/sec      4.6 mmHg

MV A max linh:      MV P1/2t: 56.0 msec   Ao max PG:        LV V1 mean P.7 cm/sec       MVA(P1/2t): 3.9 cm2   39.9 mmHg         2.5 mmHg

MV E/A: 0.69       MV dec slope:         Ao V2 mean:       LV V1 max:

                                         231.6 cm/sec      107.1 cm/sec

                   465.5 cm/sec2         Ao mean PG:       LV V1 mean:

                   MV dec time: 0.20 sec 23.8 mmHg         72.6 cm/sec

                                         Ao V2 VTI: 72.6 cmLV V1 VTI: 23.3 cm

        _______________________________________________________________

PA V2 max:         PI end-d linh:         TR max linh:       MV P1/2t-pr_phl:

91.2 cm/sec        80.4 cm/sec           291.0 cm/sec      56.0 msec



PA max PG: 3.3 mmHg                      TR max P.9 mmHg



Left Ventricle

The left ventricle is normal in size. There is normal left ventricular wall

thickness. LV EF is 65%. Left ventricular systolic function is normal. Doppler

measurements suggest impaired left ventricular relaxation, which is associated

with grade I/IV or mild diastolic dysfunction. The left ventricular wall

motion is normal. No ASD,VSD , or PFO. There is no thrombus.



Right Ventricle

The right ventricle is normal in size and function.



Atria

The right atrium is normal. The left atrial size is normal.



Mitral Valve

There is mild mitral annular calcification. There is no evidence of mitral

valve prolapse. There is no vegetation seen on the mitral valve. There is no

mitral valve stenosis. There is a trace amount of mitral regurgitation.





Aortic Valve

There is no aortic valvular vegetation. There is moderate aortic stenosis.

There is a peak gradient of 39.9 mm of Hg , and a mean gradient of 23.8 mm of

Hg. There is no LVOT obstruction. There is a mild amount of aortic

regurgitation.



Tricuspid Valve

There is no tricuspid valve prolapse. There is no tricuspid stenosis. There is

a trace amount of tricuspid regurgitation. There is mild pulmonary

hypertension by echo. RVSP is 39 to 44 mm of Hg , with A mean of 5 to 10.



Pulmonic Valve

There is no pulmonic valvular stenosis. There is a trace amount of pulmonic

regurgitation.



Great Vessels

The aortic root is normal size. The inferior vena cava appeared normal and

decreased > 50% with respiration (RAP 5-10 mmHg).





Effusions

There is no pericardial effusion.



_______________________________________________________________________________



_______________________________________________________________________________

Electronically signed by:      Shantelle Gleason      on 10/24/2019 04:02 PM



CC: IGNACIO DE LA FUENTE Lakshmi

## 2019-10-24 NOTE — PDOC PROGRESS REPORT
Subjective


Progress Note for:: 10/24/19


Subjective:: 


This is a 78-year-old male who presented with a few week history of melanotic 

stools, lightheadedness and shortness of breath.  He was found to be anemic and 

was found to have a positive FOBT.  He was admitted for likely upper GI bleed.





10/22: This morning, patient appears comfortable.  He denies abdominal pain.  He

does report that he takes a lot of Aleve at home for his joint pains.  

Hemoglobin dropped down to 7.1.  Will order 2 units of packed RBC.  Surgicalist 

has been consulted for EGD.





10/23: Patient reports he had an episode of black tarry stool overnight.  He 

also complained of urinary frequency last night.  Denies chest pain, shortness 

of breath or dizziness.  Hemoglobin has improved and remained stable after 

transfusion.  He is scheduled for an EGD and colonoscopy tomorrow by surgery.





10/24: Patient still has an episode of melena overnight.  He just got back from 

EGD and colonoscopy.  No definite source of bleeding was identified.  He does h

ave a systolic murmur suspicious for aortic stenosis.  Echocardiogram done and 

pending report. Considering possibility of Heyde's syndrome.


Reason For Visit: 


ACUTE UPPER GI BLEEDING,POSTURAL HYPOTENSION,








Physical Exam


Vital Signs: 


                                        











Temp Pulse Resp BP Pulse Ox


 


 97.9 F   69   17   144/51 H  96 


 


 10/24/19 08:25  10/24/19 13:27  10/24/19 08:25  10/24/19 08:25  10/24/19 08:25








                                 Intake & Output











 10/23/19 10/24/19 10/25/19





 06:59 06:59 06:59


 


Intake Total 5199 1720 640


 


Output Total 700  


 


Balance 4499 1720 640


 


Weight 167 lb 15.876 oz 164 lb 14.492 oz 











General appearance: PRESENT: no acute distress, well-developed, well-nourished


Head exam: PRESENT: atraumatic, normocephalic


Eye exam: PRESENT: conjunctiva pink, EOMI, PERRLA.  ABSENT: scleral icterus


Ear exam: PRESENT: normal external ear exam


Mouth exam: PRESENT: moist, tongue midline


Neck exam: ABSENT: carotid bruit, JVD, lymphadenopathy, thyromegaly


Respiratory exam: PRESENT: clear to auscultation luz.  ABSENT: rales, rhonchi, 

wheezes


Cardiovascular exam: PRESENT: RRR, systolic murmur.  ABSENT: rubs


Pulses: PRESENT: normal dorsalis pedis pul


GI/Abdominal exam: PRESENT: normal bowel sounds, soft.  ABSENT: distended, 

guarding, mass, organolmegaly, rebound, tenderness


Rectal exam: PRESENT: deferred


Neurological exam: PRESENT: alert, awake, oriented to person, oriented to place,

oriented to time, oriented to situation, CN II-XII grossly intact.  ABSENT: 

motor sensory deficit





Results


Laboratory Results: 


                                        





                                 10/24/19 05:54 





                                 10/24/19 05:54 





                                        











  10/24/19 10/24/19





  05:54 05:54


 


WBC  3.3 L 


 


RBC  2.69 L 


 


Hgb  9.0 L 


 


Hct  26.2 L 


 


MCV  98 H 


 


MCH  33.4 


 


MCHC  34.2 


 


RDW  18.4 H 


 


Plt Count  74 L 


 


Sodium   138.5


 


Potassium   3.6


 


Chloride   109 H


 


Carbon Dioxide   24


 


Anion Gap   6


 


BUN   14


 


Creatinine   1.18


 


Est GFR (African Amer)   > 60


 


Glucose   85


 


Calcium   8.3 L








                                        











  10/21/19 10/21/19 10/21/19





  14:20 14:20 18:20


 


Creatine Kinase  110  


 


CK-MB (CK-2)   1.10 


 


Troponin I   0.045  0.035














  10/21/19 10/21/19 10/22/19





  18:20 18:20 00:39


 


Creatine Kinase  98  


 


CK-MB (CK-2)   0.88  1.04


 


Troponin I   Cancelled  0.050














  10/22/19 10/22/19 10/22/19





  00:39 06:15 06:15


 


Creatine Kinase  98  108 


 


CK-MB (CK-2)    1.05


 


Troponin I    0.047














  10/22/19 10/22/19





  12:46 12:46


 


Creatine Kinase  126 


 


CK-MB (CK-2)   1.43


 


Troponin I   0.038











Impressions: 


                                        





Chest X-Ray  10/21/19 13:51


IMPRESSION:  NO ACUTE RADIOGRAPHIC FINDING IN THE CHEST.


 














Assessment and Plan





- Diagnosis


(1) Upper gastrointestinal bleed


Is this a current diagnosis for this admission?: Yes   


Plan: 


10/22: Hemoglobin dropped down to 7.1.  Will order 2 units of packed RBC.  

Surgical has been consulted for EGD.  Continue IV Protonix.





10/23: Hemoglobin has trended up and stabilized at 9.7.  Surgery has scheduled 

patient for EGD: This could be tomorrow.





10/24: Patient still has an episode of melena overnight.  He just got back from 

EGD and colonoscopy.  No definite source of bleeding was identified.  He does 

have a systolic murmur suspicious for aortic stenosis.  Echocardiogram done and 

pending report. Considering possibility of Heyde's syndrome.








(2) Anemia due to acute blood loss


Is this a current diagnosis for this admission?: Yes   


Plan: 


As per #1.








(3) Acute kidney injury (nontraumatic)


Is this a current diagnosis for this admission?: Yes   


Plan: 


Resolved with IV fluids.  Likely a combination of prerenal and ATN from 

hypotension. 








- Time


Time Spent with patient: 25-34 minutes

## 2019-10-24 NOTE — OPERATIVE REPORT
Nonrecallable Operative Report


DATE OF SURGERY: 10/24/19


PREOPERATIVE DIAGNOSIS: Symptomatic anemia and melena


POSTOPERATIVE DIAGNOSIS: 1.  Very mild gastritis, without signs of gastric 

ulceration.  2.  Mild reflux esophagitis.  3.  Internal hemorrhoids.


OPERATION: 1.  EGD with biopsy.  2.  Colonoscopy to the cecum.


SURGEON: BONIFACIO ZENG


ANESTHESIA: LMAC


TISSUE REMOVED OR ALTERED: 1.  Antrum.  2.  Distal esophagus


COMPLICATIONS: 





None apparent


ESTIMATED BLOOD LOSS: Minimal


PROCEDURE: 





Procedure in detail: After informed consent was obtained, the patient was 

brought to the operating room and laid in the left lateral decubitus position.  

The endoscope was passed down the oropharynx, down the esophagus, and into the 

stomach.  The stomach was insufflated with air.  There is a very mild amount of 

gastritis present.  There were no ulcerations.  There was no old blood.  There 

is no new blood.  The scope was placed through the pylorus and into the first 

and second portions of the duodenum, which appeared completely normal.  The 

scope was pulled back into the antrum, where biopsy was taken to rule out H. 

pylori infection.  A retroflexion maneuver was performed, noting no significant 

hiatal hernia.  The scope was pulled up into the distal esophagus, where a mild 

amount of reflux esophagitis was identified.  The scope was then  withdrawn up 

the remainder of the esophagus.  The remainder of the esophagus was smooth in 

contour without masses, lesions, or other significant abnormalities.  The scope 

was removed from the oropharynx, and this portion of the procedure was 

concluded.





Attention was then turned to the colonoscopy.  The endoscope was passed up the 

rectum, sigmoid colon, descending colon, across the transverse colon, down the 

ascending colon, and into the cecum.  The ileocecal valve and appendiceal 

orifice were identified.  The prep was very good.  The scope was then withdrawn,

circumferentially noting the mucosa.  The scope was withdrawn through the 

ascending colon, transverse colon, descending colon, sigmoid colon, and into the

rectum.  Throughout the colon, there was old blood identified.  There was no 

active bleeding.  There were no masses, lesions, ulcerations, diverticula, or 

other obvious source of the patient's blood loss.  A retroflexion maneuver was 

performed in the rectum noting small internal hemorrhoids.  The scope was 

straightened, air was suctioned from the rectum, the scope was removed, and the 

procedure was concluded.  All sponge, instrument, and needle counts were correct

x2.





Condition: Stable.





Please note that throughout the examination, no obvious source of bleeding was 

identified.  Further work-up per primary team.

## 2019-10-24 NOTE — PROGRESS NOTE
Provider Note


Provider Note: 





78-year-old male status post EGD and colonoscopy for melena and symptomatic 

anemia.  No obvious source of bleeding could be identified on either 

examination.  I would recommend cessation of all NSAIDs.  He should continue his

PPI.  If his anemia continues, further work-up with outpatient gastroenterology 

is warranted.  Surgery will sign off at this time.  Please renotify with any 

questions or concerns.

## 2019-10-25 LAB
ADD MANUAL DIFF: NO
BASOPHILS # BLD AUTO: 0 10^3/UL (ref 0–0.2)
BASOPHILS NFR BLD AUTO: 0.7 % (ref 0–2)
EOSINOPHIL # BLD AUTO: 0.6 10^3/UL (ref 0–0.6)
EOSINOPHIL NFR BLD AUTO: 16.6 % (ref 0–6)
ERYTHROCYTE [DISTWIDTH] IN BLOOD BY AUTOMATED COUNT: 18.1 % (ref 11.5–14)
HCT VFR BLD CALC: 28.5 % (ref 37.9–51)
HGB BLD-MCNC: 9.8 G/DL (ref 13.5–17)
LYMPHOCYTES # BLD AUTO: 0.5 10^3/UL (ref 0.5–4.7)
LYMPHOCYTES NFR BLD AUTO: 13.2 % (ref 13–45)
MCH RBC QN AUTO: 33.7 PG (ref 27–33.4)
MCHC RBC AUTO-ENTMCNC: 34.2 G/DL (ref 32–36)
MCV RBC AUTO: 99 FL (ref 80–97)
MONOCYTES # BLD AUTO: 0.6 10^3/UL (ref 0.1–1.4)
MONOCYTES NFR BLD AUTO: 14.1 % (ref 3–13)
NEUTROPHILS # BLD AUTO: 2.2 10^3/UL (ref 1.7–8.2)
NEUTS SEG NFR BLD AUTO: 55.4 % (ref 42–78)
PLATELET # BLD: 85 10^3/UL (ref 150–450)
RBC # BLD AUTO: 2.9 10^6/UL (ref 4.35–5.55)
TOTAL CELLS COUNTED % (AUTO): 100 %
WBC # BLD AUTO: 3.9 10^3/UL (ref 4–10.5)

## 2019-10-25 RX ADMIN — Medication SCH: at 17:52

## 2019-10-25 RX ADMIN — PANTOPRAZOLE SODIUM SCH MG: 40 INJECTION, POWDER, FOR SOLUTION INTRAVENOUS at 10:48

## 2019-10-25 RX ADMIN — PANTOPRAZOLE SODIUM SCH MG: 40 INJECTION, POWDER, FOR SOLUTION INTRAVENOUS at 22:15

## 2019-10-25 RX ADMIN — SUCRALFATE SCH GM: 1 TABLET ORAL at 10:48

## 2019-10-25 RX ADMIN — SUCRALFATE SCH GM: 1 TABLET ORAL at 12:12

## 2019-10-25 RX ADMIN — MONTELUKAST SODIUM SCH MG: 10 TABLET, FILM COATED ORAL at 17:56

## 2019-10-25 RX ADMIN — MULTIVITAMIN TABLET SCH TAB: TABLET at 10:48

## 2019-10-25 RX ADMIN — TAMSULOSIN HYDROCHLORIDE SCH MG: 0.4 CAPSULE ORAL at 17:56

## 2019-10-25 RX ADMIN — SUCRALFATE SCH GM: 1 TABLET ORAL at 22:15

## 2019-10-25 RX ADMIN — DOCUSATE SODIUM SCH: 50 LIQUID ORAL at 10:48

## 2019-10-25 RX ADMIN — Medication SCH ML: at 05:39

## 2019-10-25 RX ADMIN — TAMSULOSIN HYDROCHLORIDE SCH MG: 0.4 CAPSULE ORAL at 10:48

## 2019-10-25 RX ADMIN — ESCITALOPRAM OXALATE SCH MG: 10 TABLET, FILM COATED ORAL at 10:48

## 2019-10-25 RX ADMIN — FOLIC ACID SCH MG: 1 TABLET ORAL at 10:48

## 2019-10-25 RX ADMIN — Medication SCH ML: at 22:15

## 2019-10-25 RX ADMIN — DOCUSATE SODIUM SCH: 50 LIQUID ORAL at 17:57

## 2019-10-25 RX ADMIN — SUCRALFATE SCH GM: 1 TABLET ORAL at 17:57

## 2019-10-25 RX ADMIN — ALLOPURINOL SCH MG: 300 TABLET ORAL at 10:49

## 2019-10-26 VITALS — DIASTOLIC BLOOD PRESSURE: 63 MMHG | SYSTOLIC BLOOD PRESSURE: 168 MMHG

## 2019-10-26 LAB
ERYTHROCYTE [DISTWIDTH] IN BLOOD BY AUTOMATED COUNT: 18.7 % (ref 11.5–14)
HCT VFR BLD CALC: 24.5 % (ref 37.9–51)
HGB BLD-MCNC: 8.3 G/DL (ref 13.5–17)
MCH RBC QN AUTO: 33.6 PG (ref 27–33.4)
MCHC RBC AUTO-ENTMCNC: 33.9 G/DL (ref 32–36)
MCV RBC AUTO: 99 FL (ref 80–97)
PLATELET # BLD: 66 10^3/UL (ref 150–450)
RBC # BLD AUTO: 2.48 10^6/UL (ref 4.35–5.55)
WBC # BLD AUTO: 3.4 10^3/UL (ref 4–10.5)

## 2019-10-26 PROCEDURE — 30233R1 TRANSFUSION OF NONAUTOLOGOUS PLATELETS INTO PERIPHERAL VEIN, PERCUTANEOUS APPROACH: ICD-10-PCS | Performed by: STUDENT IN AN ORGANIZED HEALTH CARE EDUCATION/TRAINING PROGRAM

## 2019-10-26 RX ADMIN — TAMSULOSIN HYDROCHLORIDE SCH MG: 0.4 CAPSULE ORAL at 10:16

## 2019-10-26 RX ADMIN — DOCUSATE SODIUM SCH: 50 LIQUID ORAL at 17:39

## 2019-10-26 RX ADMIN — TAMSULOSIN HYDROCHLORIDE SCH MG: 0.4 CAPSULE ORAL at 17:52

## 2019-10-26 RX ADMIN — ESCITALOPRAM OXALATE SCH MG: 10 TABLET, FILM COATED ORAL at 10:15

## 2019-10-26 RX ADMIN — SUCRALFATE SCH: 1 TABLET ORAL at 10:15

## 2019-10-26 RX ADMIN — DOCUSATE SODIUM SCH MG: 50 LIQUID ORAL at 10:15

## 2019-10-26 RX ADMIN — FOLIC ACID SCH MG: 1 TABLET ORAL at 10:16

## 2019-10-26 RX ADMIN — MONTELUKAST SODIUM SCH MG: 10 TABLET, FILM COATED ORAL at 17:52

## 2019-10-26 RX ADMIN — ALLOPURINOL SCH MG: 300 TABLET ORAL at 10:15

## 2019-10-26 RX ADMIN — PANTOPRAZOLE SODIUM SCH MG: 40 INJECTION, POWDER, FOR SOLUTION INTRAVENOUS at 10:16

## 2019-10-26 RX ADMIN — SUCRALFATE SCH: 1 TABLET ORAL at 17:39

## 2019-10-26 RX ADMIN — Medication SCH: at 17:38

## 2019-10-26 RX ADMIN — SUCRALFATE SCH GM: 1 TABLET ORAL at 10:16

## 2019-10-26 RX ADMIN — Medication SCH: at 06:04

## 2019-10-26 RX ADMIN — MULTIVITAMIN TABLET SCH TAB: TABLET at 10:16

## 2019-10-26 NOTE — PDOC PROGRESS REPORT
Subjective


Progress Note for:: 10/25/19


Subjective:: 


This is a 78-year-old male who presented with a few week history of melanotic 

stools, lightheadedness and shortness of breath.  He was found to be anemic and 

was found to have a positive FOBT.  He was admitted for likely upper GI bleed.





10/22: This morning, patient appears comfortable.  He denies abdominal pain.  He

does report that he takes a lot of Aleve at home for his joint pains.  

Hemoglobin dropped down to 7.1.  Will order 2 units of packed RBC.  Surgicalist 

has been consulted for EGD.





10/23: Patient reports he had an episode of black tarry stool overnight.  He 

also complained of urinary frequency last night.  Denies chest pain, shortness 

of breath or dizziness.  Hemoglobin has improved and remained stable after 

transfusion.  He is scheduled for an EGD and colonoscopy tomorrow by surgery.





10/24: Patient still has an episode of melena overnight.  He just got back from 

EGD and colonoscopy.  No definite source of bleeding was identified.  He does h

ave a systolic murmur suspicious for aortic stenosis.  Echocardiogram done and 

pending report. Considering possibility of Heyde's syndrome.





10/25: Patient's Hb has improved. He reports his stool last night is less dark. 

Awaiting for transfer to [facility].


Reason For Visit: 


ACUTE UPPER GI BLEEDING,POSTURAL HYPOTENSION,








Physical Exam


Vital Signs: 


                                        











Temp Pulse Resp BP Pulse Ox


 


 98.0 F   91   18   87/40 L  100 


 


 10/25/19 07:30  10/25/19 07:33  10/25/19 07:30  10/25/19 07:33  10/25/19 07:33








                                 Intake & Output











 10/24/19 10/25/19 10/26/19





 06:59 06:59 06:59


 


Intake Total 1720 1640 


 


Balance 1720 1640 


 


Weight 164 lb 14.492 oz 164 lb 7.437 oz 











General appearance: PRESENT: no acute distress, well-developed, well-nourished


Head exam: PRESENT: atraumatic, normocephalic


Eye exam: PRESENT: conjunctiva pink, EOMI, PERRLA.  ABSENT: scleral icterus


Ear exam: PRESENT: normal external ear exam


Mouth exam: PRESENT: moist, tongue midline


Neck exam: ABSENT: carotid bruit, JVD, lymphadenopathy, thyromegaly


Respiratory exam: PRESENT: clear to auscultation luz.  ABSENT: rales, rhonchi, 

wheezes


Cardiovascular exam: PRESENT: RRR, systolic murmur.  ABSENT: rubs


Pulses: PRESENT: normal dorsalis pedis pul


GI/Abdominal exam: PRESENT: normal bowel sounds, soft.  ABSENT: distended, 

guarding, mass, organolmegaly, rebound, tenderness


Rectal exam: PRESENT: deferred


Neurological exam: PRESENT: alert, awake, oriented to person, oriented to place,

oriented to time, oriented to situation, CN II-XII grossly intact.  ABSENT: 

motor sensory deficit





Results


Laboratory Results: 


                                        





                                 10/24/19 05:54 





                                        











  10/24/19





  17:04


 


WBC  3.5 L


 


RBC  2.82 L


 


Hgb  9.4 L


 


Hct  27.7 L


 


MCV  98 H


 


MCH  33.4


 


MCHC  33.9


 


RDW  18.9 H


 


Plt Count  77 L


 


Seg Neutrophils %  51.1








                                        











  10/21/19 10/21/19 10/21/19





  14:20 14:20 18:20


 


Creatine Kinase  110  


 


CK-MB (CK-2)   1.10 


 


Troponin I   0.045  0.035














  10/21/19 10/21/19 10/22/19





  18:20 18:20 00:39


 


Creatine Kinase  98  


 


CK-MB (CK-2)   0.88  1.04


 


Troponin I   Cancelled  0.050














  10/22/19 10/22/19 10/22/19





  00:39 06:15 06:15


 


Creatine Kinase  98  108 


 


CK-MB (CK-2)    1.05


 


Troponin I    0.047














  10/22/19 10/22/19





  12:46 12:46


 


Creatine Kinase  126 


 


CK-MB (CK-2)   1.43


 


Troponin I   0.038











Impressions: 


                                        





Chest X-Ray  10/21/19 13:51


IMPRESSION:  NO ACUTE RADIOGRAPHIC FINDING IN THE CHEST.


 














Assessment and Plan





- Diagnosis


(1) Upper gastrointestinal bleed


Is this a current diagnosis for this admission?: Yes   


Plan: 


10/22: Hemoglobin dropped down to 7.1.  Will order 2 units of packed RBC.  

Surgical has been consulted for EGD.  Continue IV Protonix.





10/23: Hemoglobin has trended up and stabilized at 9.7.  Surgery has scheduled 

patient for EGD: This could be tomorrow.





10/24: Patient still has an episode of melena overnight.  He just got back from 

EGD and colonoscopy.  No definite source of bleeding was identified.  He does 

have a systolic murmur suspicious for aortic stenosis.  Echocardiogram done and 

pending report. Considering possibility of Heyde's syndrome.





10/25: Patient's Hb has improved. He reports his stool last night is less dark. 

Awaiting for transfer to [facility].








(2) Anemia due to acute blood loss


Is this a current diagnosis for this admission?: Yes   


Plan: 


As per #1.








(3) Acute kidney injury (nontraumatic)


Is this a current diagnosis for this admission?: Yes   


Plan: 


Resolved with IV fluids.  Likely a combination of prerenal and ATN from 

hypotension. 








(4) Aortic stenosis


Is this a current diagnosis for this admission?: Yes   





- Time


Time Spent with patient: 25-34 minutes

## 2019-10-26 NOTE — PROGRESS NOTE
Provider Note


Provider Note: 


Patient had 3 episodes of dark, tarry stool again this morning.  Hemoglobin 

dropped to 8.3.  Platelet also went down to 66.  Will order platelet 

transfusion.  [facility]

## 2020-02-02 ENCOUNTER — HOSPITAL ENCOUNTER (EMERGENCY)
Dept: HOSPITAL 62 - ER | Age: 80
LOS: 1 days | Discharge: ANOTHER HOSPITAL | End: 2020-02-03
Payer: MEDICARE

## 2020-02-02 DIAGNOSIS — J95.89: Primary | ICD-10-CM

## 2020-02-02 DIAGNOSIS — R79.89: ICD-10-CM

## 2020-02-02 DIAGNOSIS — R06.02: ICD-10-CM

## 2020-02-02 DIAGNOSIS — R55: ICD-10-CM

## 2020-02-02 DIAGNOSIS — J18.8: ICD-10-CM

## 2020-02-02 DIAGNOSIS — R42: ICD-10-CM

## 2020-02-02 LAB
ABSOLUTE LYMPHOCYTES# (MANUAL): 0.9 10^3/UL (ref 0.5–4.7)
ABSOLUTE MONOCYTES # (MANUAL): 0.8 10^3/UL (ref 0.1–1.4)
ADD MANUAL DIFF: YES
ALBUMIN SERPL-MCNC: 3 G/DL (ref 3.5–5)
ALP SERPL-CCNC: 239 U/L (ref 38–126)
ANION GAP SERPL CALC-SCNC: 7 MMOL/L (ref 5–19)
ANISOCYTOSIS BLD QL SMEAR: (no result)
APPEARANCE UR: (no result)
APTT BLD: 27.8 SEC (ref 23.5–35.8)
APTT PPP: YELLOW S
AST SERPL-CCNC: 78 U/L (ref 17–59)
BASOPHILS NFR BLD MANUAL: 0 % (ref 0–2)
BILIRUB DIRECT SERPL-MCNC: 0 MG/DL (ref 0–0.4)
BILIRUB SERPL-MCNC: 0.4 MG/DL (ref 0.2–1.3)
BILIRUB UR QL STRIP: NEGATIVE
BUN SERPL-MCNC: 24 MG/DL (ref 7–20)
CALCIUM: 8.2 MG/DL (ref 8.4–10.2)
CHLORIDE SERPL-SCNC: 102 MMOL/L (ref 98–107)
CK MB SERPL-MCNC: 0.51 NG/ML (ref ?–4.55)
CK SERPL-CCNC: 74 U/L (ref 55–170)
CO2 SERPL-SCNC: 23 MMOL/L (ref 22–30)
EOSINOPHIL NFR BLD MANUAL: 5 % (ref 0–6)
ERYTHROCYTE [DISTWIDTH] IN BLOOD BY AUTOMATED COUNT: 16.8 % (ref 11.5–14)
GLUCOSE SERPL-MCNC: 135 MG/DL (ref 75–110)
GLUCOSE UR STRIP-MCNC: NEGATIVE MG/DL
HCT VFR BLD CALC: 23.6 % (ref 37.9–51)
HGB BLD-MCNC: 8 G/DL (ref 13.5–17)
INR PPP: 1.1
KETONES UR STRIP-MCNC: NEGATIVE MG/DL
MCH RBC QN AUTO: 34.1 PG (ref 27–33.4)
MCHC RBC AUTO-ENTMCNC: 34.1 G/DL (ref 32–36)
MCV RBC AUTO: 100 FL (ref 80–97)
MONOCYTES % (MANUAL): 14 % (ref 3–13)
NEUTS BAND NFR BLD MANUAL: 6 % (ref 3–5)
NITRITE UR QL STRIP: NEGATIVE
PH UR STRIP: 6 [PH] (ref 5–9)
PLATELET # BLD: 71 10^3/UL (ref 150–450)
PLATELET COMMENT: (no result)
POLYCHROMASIA BLD QL SMEAR: (no result)
POTASSIUM SERPL-SCNC: 4.8 MMOL/L (ref 3.6–5)
PROT SERPL-MCNC: 7.3 G/DL (ref 6.3–8.2)
PROT UR STRIP-MCNC: 30 MG/DL
PROTHROMBIN TIME: 14.2 SEC (ref 11.4–15.4)
RBC # BLD AUTO: 2.35 10^6/UL (ref 4.35–5.55)
SEGMENTED NEUTROPHILS % (MAN): 59 % (ref 42–78)
SP GR UR STRIP: 1.01
TOTAL CELLS COUNTED BLD: 100
TOXIC GRANULES BLD QL SMEAR: (no result)
TROPONIN I SERPL-MCNC: 0.2 NG/ML
UROBILINOGEN UR-MCNC: NEGATIVE MG/DL (ref ?–2)
VARIANT LYMPHS NFR BLD MANUAL: 16 % (ref 13–45)
WBC # BLD AUTO: 5.9 10^3/UL (ref 4–10.5)

## 2020-02-02 PROCEDURE — 99285 EMERGENCY DEPT VISIT HI MDM: CPT

## 2020-02-02 PROCEDURE — 71045 X-RAY EXAM CHEST 1 VIEW: CPT

## 2020-02-02 PROCEDURE — 96367 TX/PROPH/DG ADDL SEQ IV INF: CPT

## 2020-02-02 PROCEDURE — 80053 COMPREHEN METABOLIC PANEL: CPT

## 2020-02-02 PROCEDURE — 96366 THER/PROPH/DIAG IV INF ADDON: CPT

## 2020-02-02 PROCEDURE — 84484 ASSAY OF TROPONIN QUANT: CPT

## 2020-02-02 PROCEDURE — 93010 ELECTROCARDIOGRAM REPORT: CPT

## 2020-02-02 PROCEDURE — 85610 PROTHROMBIN TIME: CPT

## 2020-02-02 PROCEDURE — 83605 ASSAY OF LACTIC ACID: CPT

## 2020-02-02 PROCEDURE — 82550 ASSAY OF CK (CPK): CPT

## 2020-02-02 PROCEDURE — 82553 CREATINE MB FRACTION: CPT

## 2020-02-02 PROCEDURE — 36415 COLL VENOUS BLD VENIPUNCTURE: CPT

## 2020-02-02 PROCEDURE — 96365 THER/PROPH/DIAG IV INF INIT: CPT

## 2020-02-02 PROCEDURE — 85025 COMPLETE CBC W/AUTO DIFF WBC: CPT

## 2020-02-02 PROCEDURE — 87040 BLOOD CULTURE FOR BACTERIA: CPT

## 2020-02-02 PROCEDURE — 85730 THROMBOPLASTIN TIME PARTIAL: CPT

## 2020-02-02 PROCEDURE — 83880 ASSAY OF NATRIURETIC PEPTIDE: CPT

## 2020-02-02 PROCEDURE — 81001 URINALYSIS AUTO W/SCOPE: CPT

## 2020-02-02 PROCEDURE — 93005 ELECTROCARDIOGRAM TRACING: CPT

## 2020-02-02 NOTE — RADIOLOGY REPORT (SQ)
EXAM DESCRIPTION: 



XR CHEST 1 VIEW



COMPLETED DATE/TME:  02/02/2020 20:49



CLINICAL HISTORY: 



79 years, Male, shortness of breath



COMPARISON:

None.



NUMBER OF VIEWS:

Single



TECHNIQUE:





LIMITATIONS:

None.



FINDINGS:



Cardiomediastinal is enlarged with postsurgical change. Chronic

parenchymal lung change.



Streaky airspace disease left lung base, new from prior Old

posttraumatic change particularly right hemithorax.



IMPRESSION:



Minimal streaky airspace disease left lung base, progressive.

Presumed infectious inflammatory

 



copyright 2011 [employer]- All Rights Reserved

## 2020-02-02 NOTE — ER DOCUMENT REPORT
ED Dizziness/Weakness





- General


Chief Complaint: Near Syncope


Stated Complaint: NEAR SYNCOPE


Time Seen by Provider: 02/02/20 20:12


Notes: 


Patient is a 79-year-old male that comes to the emergency department for chief 

complaint of an episode at dinner where he suddenly felt lightheaded, his family

states he became pale, he states he felt like he was going to pass out.  Patient

did not have a syncopal episode, he denies chest pain during the process, he 

states he feels fine now.  Patient states that over the past day or so he has 

started feeling some vague shortness of breath as well.  Patient is status post 

aortic valve replacement by Dr. Alarcon at [facility] on 1/28/2020.  Patient denies 

fever, nausea or vomiting, blood in the stool, or any other complaints.  He is 

on aspirin, Plavix, has a history of hypertension, RA on MTX, BPH, and he did 

previously have an upper GI bleed.  Family states they believe he was 

hypotensive as well.  He came by EMS, EMS reported an initial blood pressure of 

124/63.  Family states that they checked his blood pressure this morning and it 

was 120/40.  No reported systolic blood pressures less than 120.


TRAVEL OUTSIDE OF THE [place] IN LAST 30 DAYS: No





- Related Data


Allergies/Adverse Reactions: 


                                        





No Known Allergies Allergy (Verified 05/22/15 10:54)


   











Past Medical History





- General


Information source: Patient





- Social History


Smoking Status: Never Smoker


Frequency of alcohol use: None


Drug Abuse: None


Lives with: Family


Family History: Arthritis, CAD, DM, Hypertension, Malignancy, Other - Peripheral

vascular disease


Patient has suicidal ideation: No


Patient has homicidal ideation: No





- Past Medical History


Cardiac Medical History: Reports: Hx Hypertension, Hx Heart Murmur - aortic 

valve (replaced now)


   Denies: Hx Coronary Artery Disease, Hx Hypercholesterolemia


Pulmonary Medical History: 


   Denies: Hx Asthma, Hx COPD


Neurological Medical History: Denies: Hx Seizures


Endocrine Medical History: Denies: Hx Diabetes Mellitus Type 1, Hx Diabetes 

Mellitus Type 2, Hx Hyperthyroidism, Hx Hypothyroidism


Renal/ Medical History: Reports: Hx Benign Prostatic Hyperplasia


GI Medical History: Denies: Hx Cirrhosis, Hx Crohn's Disease, Hx 

Gastroesophageal Reflux Disease, Hx Hepatitis, Hx Ulcerative Colitis


Musculoskeletal Medical History: Reports Hx Arthritis - Rheumatoid arthritis, 

Reports Hx Gout


Skin Medical History: Denies Hx Eczema, Denies Hx Psoriasis


Psychiatric Medical History: Reports: Hx Depression


Infectious Medical History: Denies: Hx Hepatitis


Past Surgical History: Reports: Hx Cardiac Surgery - AORTIC VALVE REPLACED 

1/28/20, Hx Orthopedic Surgery - Neck surgery





- Immunizations


Hx Diphtheria, Pertussis, Tetanus Vaccination: Yes





Review of Systems





- Review of Systems


Constitutional: See HPI


EENT: No symptoms reported


Cardiovascular: See HPI


Respiratory: See HPI


Gastrointestinal: No symptoms reported


Genitourinary: No symptoms reported


Male Genitourinary: No symptoms reported


Musculoskeletal: No symptoms reported


Skin: No symptoms reported


Hematologic/Lymphatic: No symptoms reported


Neurological/Psychological: No symptoms reported





Physical Exam





- Vital signs


Vitals: 


                                        











Resp BP


 


 19   130/64 H


 


 02/02/20 19:31  02/02/20 19:31














- Notes


Notes: 





GENERAL: Alert, interacts well. No acute distress.


HEAD: Normocephalic, atraumatic.


EYES: Pupils equal, round, and reactive to light. Extraocular movements intact.


ENT: Oral mucosa moist, tongue midline. Oropharynx unremarkable. Airway patent. 


LUNGS: Clear to auscultation bilaterally, no wheezes, rales, or rhonchi. No 

respiratory distress.


HEART: Regular rate and rhythm.  1/6 murmur heard throughout


ABDOMEN: Soft, non-tender. Non-distended. Bowel sounds present in all 4 

quadrants.


EXTREMITIES: Moves all 4 extremities spontaneously. No edema, strong radial and 

dorsalis pedis pulses bilaterally. No cyanosis.


BACK: no cervical, thoracic, lumbar midline tenderness. No saddle anesthesia, 

normal distal neurovascular exam. Moves all extremities in full range of motion.


NEUROLOGICAL: Alert and oriented x3. Normal speech. Cranial nerves II through 

XII grossly intact. 


PSYCH: Normal affect, normal mood.


SKIN: Warm, dry, normal turgor. No rashes or lesions noted.





Course





- Re-evaluation


Re-evalutation: 





02/02/20 20:30


Initial evaluation patient is asymptomatic, well-appearing, vital signs 

unremarkable with normal heart rate and rhythm, blood pressure 124/62, pulse 

oxygenation 100% on room air, clear lungs, no signs of distress.  Work-up 

pending.





CBC does not show leukocytosis, hemoglobin is 8 but previous was 8.3, macrocytic

anemia.  CBC does show 6% bandemia.  Lactic acid is not elevated.  Chemistry is 

nonspecific with mildly elevated creatinine similar to prior.  Troponin is 

elevated at 0.2, BNP is 1040.  I do not hear rales on exam, patient has no lower

extremity swelling, chest x-ray does not show vascular congestion.  Chest x-ray 

does indicate left lower lobe pneumonia.  I do suspect patient has pneumonia 

based on his progressive shortness of breath, weakness, bandemia, chest x-ray.  

Patient will be started on antibiotics for hospital-acquired pneumonia, giving 

cefepime and vancomycin.  Discussed with Dr. Arciniega, because patient is 

postoperative, had recent valvular surgery with elevated troponin, 

recommendations for patient to be transferred back to [facility] for continued care.  

Family is very agreeable with this and states this is their preference.





02/02/20 22:20


I spoke with Dr. Bethea, hospitalist, patient is accepted for transfer.  Family 

states appreciation and agreement pending bed assignment for transfer.





02/03/20 00:50


We do have a bed, I reevaluated patient at bedside, EMS team is going to be here

shortly.  Patient states his headache from earlier resolved when I gave him 

Tylenol, he has no additional complaints, he denies current shortness of breath,

chest pain, or dizziness.  No change in vital signs.  Patient is stable for 

transport.








- Vital Signs


Vital signs: 


                                        











Temp Pulse Resp BP Pulse Ox


 


 98.1 F   82   15   121/62   99 


 


 02/03/20 01:32  02/02/20 19:50  02/03/20 01:32  02/03/20 01:32  02/03/20 01:32














- Laboratory


Result Diagrams: 


                                 02/02/20 19:38





                                 02/02/20 19:38


Laboratory results interpreted by me: 


                                        











  02/02/20 02/02/20 02/02/20





  19:38 19:38 19:38


 


RBC  2.35 L  


 


Hgb  8.0 L  


 


Hct  23.6 L  


 


MCV  100 H  


 


MCH  34.1 H  


 


RDW  16.8 H  


 


Plt Count  71 L  


 


Band Neutrophils %  6 H  


 


Monocytes % (Manual)  14 H  


 


Sodium   132.4 L 


 


BUN   24 H 


 


Creatinine   1.42 H 


 


Est GFR ( Amer)   58 L 


 


Est GFR (MDRD) Non-Af   48 L 


 


Glucose   135 H 


 


Calcium   8.2 L 


 


AST   78 H 


 


Alkaline Phosphatase   239 H 


 


NT-Pro-B Natriuret Pep    1060 H


 


Albumin   3.0 L 


 


Urine Protein   


 


Urine Blood   


 


Ur Leukocyte Esterase   


 


Urine Ascorbic Acid   














  02/02/20





  21:48


 


RBC 


 


Hgb 


 


Hct 


 


MCV 


 


MCH 


 


RDW 


 


Plt Count 


 


Band Neutrophils % 


 


Monocytes % (Manual) 


 


Sodium 


 


BUN 


 


Creatinine 


 


Est GFR ( Amer) 


 


Est GFR (MDRD) Non-Af 


 


Glucose 


 


Calcium 


 


AST 


 


Alkaline Phosphatase 


 


NT-Pro-B Natriuret Pep 


 


Albumin 


 


Urine Protein  30 H


 


Urine Blood  MODERATE H


 


Ur Leukocyte Esterase  TRACE H


 


Urine Ascorbic Acid  40 H














- EKG Interpretation by Me


Additional EKG results interpreted by me: 


EKG shows sinus rhythm at a rate of 78, borderline right bundle branch block, 

there is ST segment elevation in lead II but not in contiguous leads, no T wave 

inversions, normal axis.  QTC of 470.  EKG reviewed with Dr. Jones.





Discharge





- Discharge


Clinical Impression: 


 Syncope, near, Shortness of breath, Post-op pneumonia, Elevated troponin





Condition: Stable


Disposition: [facility]

## 2020-02-03 VITALS — DIASTOLIC BLOOD PRESSURE: 62 MMHG | SYSTOLIC BLOOD PRESSURE: 121 MMHG

## 2020-03-07 ENCOUNTER — HOSPITAL ENCOUNTER (EMERGENCY)
Dept: HOSPITAL 62 - ER | Age: 80
Discharge: HOME | End: 2020-03-07
Payer: MEDICARE

## 2020-03-07 VITALS — DIASTOLIC BLOOD PRESSURE: 69 MMHG | SYSTOLIC BLOOD PRESSURE: 129 MMHG

## 2020-03-07 DIAGNOSIS — Z95.2: ICD-10-CM

## 2020-03-07 DIAGNOSIS — I10: ICD-10-CM

## 2020-03-07 DIAGNOSIS — R55: Primary | ICD-10-CM

## 2020-03-07 LAB
ADD MANUAL DIFF: NO
ALBUMIN SERPL-MCNC: 3.9 G/DL (ref 3.5–5)
ALP SERPL-CCNC: 230 U/L (ref 38–126)
ANION GAP SERPL CALC-SCNC: 10 MMOL/L (ref 5–19)
AST SERPL-CCNC: 79 U/L (ref 17–59)
BASOPHILS # BLD AUTO: 0.1 10^3/UL (ref 0–0.2)
BASOPHILS NFR BLD AUTO: 1.1 % (ref 0–2)
BILIRUB DIRECT SERPL-MCNC: 0.1 MG/DL (ref 0–0.4)
BILIRUB SERPL-MCNC: 0.3 MG/DL (ref 0.2–1.3)
BUN SERPL-MCNC: 19 MG/DL (ref 7–20)
CALCIUM: 8.7 MG/DL (ref 8.4–10.2)
CHLORIDE SERPL-SCNC: 106 MMOL/L (ref 98–107)
CO2 SERPL-SCNC: 23 MMOL/L (ref 22–30)
EOSINOPHIL # BLD AUTO: 1.2 10^3/UL (ref 0–0.6)
EOSINOPHIL NFR BLD AUTO: 21.6 % (ref 0–6)
ERYTHROCYTE [DISTWIDTH] IN BLOOD BY AUTOMATED COUNT: 15.8 % (ref 11.5–14)
GLUCOSE SERPL-MCNC: 125 MG/DL (ref 75–110)
HCT VFR BLD CALC: 30.8 % (ref 37.9–51)
HGB BLD-MCNC: 10.1 G/DL (ref 13.5–17)
INR PPP: 0.94
LYMPHOCYTES # BLD AUTO: 1.3 10^3/UL (ref 0.5–4.7)
LYMPHOCYTES NFR BLD AUTO: 24.5 % (ref 13–45)
MCH RBC QN AUTO: 32.5 PG (ref 27–33.4)
MCHC RBC AUTO-ENTMCNC: 32.7 G/DL (ref 32–36)
MCV RBC AUTO: 99 FL (ref 80–97)
MONOCYTES # BLD AUTO: 0.6 10^3/UL (ref 0.1–1.4)
MONOCYTES NFR BLD AUTO: 10.8 % (ref 3–13)
NEUTROPHILS # BLD AUTO: 2.2 10^3/UL (ref 1.7–8.2)
NEUTS SEG NFR BLD AUTO: 42 % (ref 42–78)
PLATELET # BLD: 97 10^3/UL (ref 150–450)
POTASSIUM SERPL-SCNC: 4.5 MMOL/L (ref 3.6–5)
PROT SERPL-MCNC: 9 G/DL (ref 6.3–8.2)
PROTHROMBIN TIME: 12.6 SEC (ref 11.4–15.4)
RBC # BLD AUTO: 3.1 10^6/UL (ref 4.35–5.55)
TOTAL CELLS COUNTED % (AUTO): 100 %
WBC # BLD AUTO: 5.3 10^3/UL (ref 4–10.5)

## 2020-03-07 PROCEDURE — 85610 PROTHROMBIN TIME: CPT

## 2020-03-07 PROCEDURE — 93005 ELECTROCARDIOGRAM TRACING: CPT

## 2020-03-07 PROCEDURE — 36415 COLL VENOUS BLD VENIPUNCTURE: CPT

## 2020-03-07 PROCEDURE — 93010 ELECTROCARDIOGRAM REPORT: CPT

## 2020-03-07 PROCEDURE — 85025 COMPLETE CBC W/AUTO DIFF WBC: CPT

## 2020-03-07 PROCEDURE — 99284 EMERGENCY DEPT VISIT MOD MDM: CPT

## 2020-03-07 PROCEDURE — 80053 COMPREHEN METABOLIC PANEL: CPT

## 2020-03-07 NOTE — EKG REPORT
SEVERITY:- ABNORMAL ECG -

SINUS RHYTHM

RIGHT BUNDLE BRANCH BLOCK

:

Confirmed by: Gamaliel Ford MD 07-Mar-2020 14:42:28

## 2020-03-08 NOTE — ER DOCUMENT REPORT
Entered by TERI ESPANA SCRIBE  03/07/20 9283 





Acting as scribe for:NAKUL OLIVEIRA MD





ED General





- General


Chief Complaint: Near Syncope


Stated Complaint: POSSIBLE SYNCOPE


Time Seen by Provider: 03/07/20 12:52


Primary Care Provider: 


DON RAMOS MD [Primary Care Provider] - Follow up as needed


Information source: Patient


Notes: 


This 79-year-old male patient presents to the emergency department today with 

complaints of a near syncopal event just prior to arrival.  Patient states that 

in the past he has had this happen and was told that if he ever felt dizzy again

that he needed to lay down so that he would not pass out.  Patient states that 

he was at [facility] and the [facility] today and he became dizzy and the next thing he 

remembers was being helped up.  Patient states that bystanders at [facility] and the [facility] told him that he did not lose consciousness, that he gently rested himself

down to the ground.


TRAVEL OUTSIDE OF THE [place] IN LAST 30 DAYS: No





- Related Data


Allergies/Adverse Reactions: 


                                        





No Known Allergies Allergy (Verified 05/22/15 10:54)


   








Home Medications: amlodipine.  lexapro.  mg oxide.  plavix





Past Medical History





- General


Information source: Patient





- Social History


Smoking Status: Unknown if Ever Smoked


Cigarette use (# per day): No


Frequency of alcohol use: None


Drug Abuse: None


Lives with: Family


Family History: Arthritis, CAD, DM, Hypertension, Malignancy, Other - Peripheral

vascular disease


Patient has suicidal ideation: No


Patient has homicidal ideation: No





- Past Medical History


Cardiac Medical History: Reports: Hx Hypertension, Hx Heart Murmur - aortic 

valve (replaced now)


   Denies: Hx Coronary Artery Disease, Hx Hypercholesterolemia


Pulmonary Medical History: 


   Denies: Hx Asthma, Hx COPD


Neurological Medical History: Denies: Hx Seizures


Endocrine Medical History: Denies: Hx Diabetes Mellitus Type 1, Hx Diabetes 

Mellitus Type 2, Hx Hyperthyroidism, Hx Hypothyroidism


Renal/ Medical History: Reports: Hx Benign Prostatic Hyperplasia


GI Medical History: Denies: Hx Cirrhosis, Hx Crohn's Disease, Hx 

Gastroesophageal Reflux Disease, Hx Hepatitis, Hx Ulcerative Colitis


Musculoskeletal Medical History: Reports Hx Arthritis - Rheumatoid arthritis, 

Reports Hx Gout


Skin Medical History: Denies Hx Eczema, Denies Hx Psoriasis


Psychiatric Medical History: Reports: Hx Depression


Infectious Medical History: Denies: Hx Hepatitis


Past Surgical History: Reports: Hx Cardiac Surgery - AORTIC VALVE REPLACED 

1/28/20, Hx Orthopedic Surgery - Neck surgery





- Immunizations


Hx Diphtheria, Pertussis, Tetanus Vaccination: Yes





Review of Systems





- Review of Systems


Constitutional: No symptoms reported


EENT: No symptoms reported


Cardiovascular: See HPI, Syncope.  denies: Chest pain


Respiratory: No symptoms reported


Gastrointestinal: No symptoms reported


Genitourinary: No symptoms reported


Male Genitourinary: No symptoms reported


Musculoskeletal: No symptoms reported


Skin: No symptoms reported


Hematologic/Lymphatic: No symptoms reported


Neurological/Psychological: No symptoms reported


-: Yes All other systems reviewed and negative





Physical Exam





- Vital signs


Vitals: 


                                        











Resp Pulse Ox


 


 15   98 


 


 03/07/20 12:06  03/07/20 12:06














- General


General appearance: Appears well, Alert


In distress: None





- HEENT


Head: Normocephalic, Atraumatic


Eyes: Normal


Pupils: PERRL





- Respiratory


Respiratory status: No respiratory distress


Chest status: Nontender


Breath sounds: Normal


Chest palpation: Normal





- Cardiovascular


Rhythm: Regular


Heart sounds: Normal auscultation


Murmur: No





- Abdominal


Inspection: Normal


Distension: No distension


Bowel sounds: Normal


Tenderness: Nontender


Organomegaly: No organomegaly





- Extremities


General upper extremity: Normal inspection, Nontender.  No: Edema


General lower extremity: Normal inspection, Nontender.  No: Edema





- Neurological


Neuro grossly intact: Yes


Cognition: Normal


Orientation: AAOx4


[place] Coma Scale Eye Opening: Spontaneous


[place] Coma Scale Verbal: Oriented


[place] Coma Scale Motor: Obeys Commands


[place] Coma Scale Total: 15


Speech: Normal


Cranial nerves: Normal.  No: Facial palsy, Sensory deficit, Tongue deviation


Cerebellar coordination: Normal, Heel-shin, Finger-nose rhombey


Motor strength normal: LUE, RUE, LLE, RLE


Additional motor exam normals: Equal , Pronator drift


Sensory: Normal





- Psychological


Associated symptoms: Normal affect, Normal mood





- Skin


Skin Temperature: Warm


Skin Moisture: Dry


Skin Color: Normal





Course





- Re-evaluation


Re-evalutation: 





03/07/20 13:44


Patient has recurrent episodes of vasovagal syncopal episodes.  Patient states 

that he has been worked up at [facility] for this problem as well and has 

had 3 episodes since December 2019.  Patient knows that when he has symptoms, 

and on that he is to lie flat until he recovers.  Today he was out shopping felt

lightheaded and dizzy had a near syncopal spell without passing out.  EMS was 

called and patient was brought to the hospital patient states that if his labs 

are normal and and he is in good cardiovascular state that he really does not 

want any further work-up done.  His wife is present witnessing the same 

conversation and both ready to leave at this time.





- Vital Signs


Vital signs: 


                                        











Temp Pulse Resp BP Pulse Ox


 


 97.6 F   60   15   143/72 H  100 


 


 03/07/20 12:23  03/07/20 13:39  03/07/20 13:35  03/07/20 13:39  03/07/20 13:35











03/07/20 13:43


Patient had orthostatic blood pressure and pulse check showing heart rate went 

from 60-68 upon standing and systolic blood pressure went from 1 43-1 30.  

Patient was asymptomatic.





- Laboratory


Result Diagrams: 


                                 03/07/20 11:41





                                 03/07/20 11:41


Laboratory results interpreted by me: 


                                        











  03/07/20 03/07/20





  11:41 11:41


 


RBC  3.10 L 


 


Hgb  10.1 L 


 


Hct  30.8 L 


 


MCV  99 H 


 


RDW  15.8 H 


 


Plt Count  97 L 


 


Eos % (Auto)  21.6 H 


 


Absolute Eos (auto)  1.2 H 


 


Creatinine   1.51 H


 


Est GFR ( Amer)   54 L


 


Est GFR (MDRD) Non-Af   45 L


 


Glucose   125 H


 


AST   79 H


 


Alkaline Phosphatase   230 H


 


Total Protein   9.0 H











03/07/20 13:43


Lab results insignificant for any acute process.  Patient hemoglobin 10.1 

creatinine 1.5 patient commonly has an elevated creatinine around 1.4.





- Diagnostic Test


Radiology results interpreted by me: 





03/07/20 time 1215


Twelve-lead EKG done 3/7/2020,  normal sinus rhythm rate of 63 right bundle 

branch block no other acute process.





Discharge





- Discharge


Clinical Impression: 


 Vasovagal near syncope





Condition: Stable


Disposition: HOME, SELF-CARE


Additional Instructions: 


Vasovagal Symptoms





     Your symptoms seem to be due to a fall in blood pressure, caused by the 

interaction of your nervous system with your circulatory system. This can result

in abnormally slow pulse rate, faintness, abnormal sensations, low blood 

pressure, difficulty with vision, or fainting (syncope).   Vasovagal symptoms 

may be brought on by emotional distress, pain, dehydration, bleeding, or 

medication effects.  Often, no cause can be identified.


     Your exam has revealed no signs of a serious problem.  Usually, no further 

tests are required.  However, if further workup has been recommended it's 

important that you follow up as instructed.


     Should you feel lightheaded or "about to faint," you should sit or lie down

as quickly as possible.  The episode will usually pass. Recurring symptoms will 

require further evaluation to determine the cause.


     Call the physician if you develop severe prolonged dizziness, headache, 

chest pain, shortness of breath, or other new symptoms.





Referrals: 


DON RAMOS MD [Primary Care Provider] - Follow up as needed





I personally performed the services described in the documentation, reviewed and

edited the documentation which was dictated to the scribe in my presence, and it

accurately records my words and actions. Statement Selected

## 2023-01-20 NOTE — EKG REPORT
SEVERITY:- ABNORMAL ECG -

SINUS RHYTHM

RIGHT BUNDLE BRANCH BLOCK

ST ELEVATION, BORDERLINE

:

Confirmed by: Raul Lehman 03-Feb-2020 00:17:11 NNP Yasmani/[email or web address]